# Patient Record
Sex: FEMALE | Race: WHITE | NOT HISPANIC OR LATINO | URBAN - METROPOLITAN AREA
[De-identification: names, ages, dates, MRNs, and addresses within clinical notes are randomized per-mention and may not be internally consistent; named-entity substitution may affect disease eponyms.]

---

## 2018-03-11 ENCOUNTER — EMERGENCY (EMERGENCY)
Facility: HOSPITAL | Age: 48
LOS: 1 days | Discharge: HOME | End: 2018-03-11
Attending: EMERGENCY MEDICINE
Payer: COMMERCIAL

## 2018-03-11 VITALS
OXYGEN SATURATION: 99 % | TEMPERATURE: 98 F | HEART RATE: 95 BPM | RESPIRATION RATE: 20 BRPM | DIASTOLIC BLOOD PRESSURE: 61 MMHG | SYSTOLIC BLOOD PRESSURE: 116 MMHG

## 2018-03-11 VITALS
TEMPERATURE: 98 F | HEART RATE: 76 BPM | OXYGEN SATURATION: 97 % | RESPIRATION RATE: 18 BRPM | DIASTOLIC BLOOD PRESSURE: 75 MMHG | SYSTOLIC BLOOD PRESSURE: 165 MMHG

## 2018-03-11 LAB
ALBUMIN SERPL ELPH-MCNC: 4.5 G/DL — SIGNIFICANT CHANGE UP (ref 3.3–5.2)
ALP SERPL-CCNC: 79 U/L — SIGNIFICANT CHANGE UP (ref 40–120)
ALT FLD-CCNC: 9 U/L — SIGNIFICANT CHANGE UP
ANION GAP SERPL CALC-SCNC: 15 MMOL/L — SIGNIFICANT CHANGE UP (ref 5–17)
AST SERPL-CCNC: 16 U/L — SIGNIFICANT CHANGE UP
BASOPHILS # BLD AUTO: 0 K/UL — SIGNIFICANT CHANGE UP (ref 0–0.2)
BASOPHILS NFR BLD AUTO: 0.4 % — SIGNIFICANT CHANGE UP (ref 0–2)
BILIRUB SERPL-MCNC: 0.3 MG/DL — LOW (ref 0.4–2)
BUN SERPL-MCNC: 9 MG/DL — SIGNIFICANT CHANGE UP (ref 8–20)
CALCIUM SERPL-MCNC: 9.6 MG/DL — SIGNIFICANT CHANGE UP (ref 8.6–10.2)
CHLORIDE SERPL-SCNC: 96 MMOL/L — LOW (ref 98–107)
CO2 SERPL-SCNC: 27 MMOL/L — SIGNIFICANT CHANGE UP (ref 22–29)
CREAT SERPL-MCNC: 0.47 MG/DL — LOW (ref 0.5–1.3)
EOSINOPHIL # BLD AUTO: 0 K/UL — SIGNIFICANT CHANGE UP (ref 0–0.5)
EOSINOPHIL NFR BLD AUTO: 0.2 % — SIGNIFICANT CHANGE UP (ref 0–6)
GLUCOSE SERPL-MCNC: 234 MG/DL — HIGH (ref 70–115)
HCG SERPL-ACNC: <5 MIU/ML — SIGNIFICANT CHANGE UP
HCT VFR BLD CALC: 36.9 % — LOW (ref 37–47)
HGB BLD-MCNC: 11.3 G/DL — LOW (ref 12–16)
LIDOCAIN IGE QN: 8 U/L — LOW (ref 22–51)
LYMPHOCYTES # BLD AUTO: 1 K/UL — SIGNIFICANT CHANGE UP (ref 1–4.8)
LYMPHOCYTES # BLD AUTO: 10.7 % — LOW (ref 20–55)
MCHC RBC-ENTMCNC: 24.4 PG — LOW (ref 27–31)
MCHC RBC-ENTMCNC: 30.6 G/DL — LOW (ref 32–36)
MCV RBC AUTO: 79.5 FL — LOW (ref 81–99)
MONOCYTES # BLD AUTO: 0.6 K/UL — SIGNIFICANT CHANGE UP (ref 0–0.8)
MONOCYTES NFR BLD AUTO: 6.2 % — SIGNIFICANT CHANGE UP (ref 3–10)
NEUTROPHILS # BLD AUTO: 7.6 K/UL — SIGNIFICANT CHANGE UP (ref 1.8–8)
NEUTROPHILS NFR BLD AUTO: 82.4 % — HIGH (ref 37–73)
PLATELET # BLD AUTO: 522 K/UL — HIGH (ref 150–400)
POTASSIUM SERPL-MCNC: 4 MMOL/L — SIGNIFICANT CHANGE UP (ref 3.5–5.3)
POTASSIUM SERPL-SCNC: 4 MMOL/L — SIGNIFICANT CHANGE UP (ref 3.5–5.3)
PROT SERPL-MCNC: 7.6 G/DL — SIGNIFICANT CHANGE UP (ref 6.6–8.7)
RBC # BLD: 4.64 M/UL — SIGNIFICANT CHANGE UP (ref 4.4–5.2)
RBC # FLD: 13.8 % — SIGNIFICANT CHANGE UP (ref 11–15.6)
SODIUM SERPL-SCNC: 138 MMOL/L — SIGNIFICANT CHANGE UP (ref 135–145)
WBC # BLD: 9.2 K/UL — SIGNIFICANT CHANGE UP (ref 4.8–10.8)
WBC # FLD AUTO: 9.2 K/UL — SIGNIFICANT CHANGE UP (ref 4.8–10.8)

## 2018-03-11 PROCEDURE — 85027 COMPLETE CBC AUTOMATED: CPT

## 2018-03-11 PROCEDURE — 84702 CHORIONIC GONADOTROPIN TEST: CPT

## 2018-03-11 PROCEDURE — 99284 EMERGENCY DEPT VISIT MOD MDM: CPT | Mod: 25

## 2018-03-11 PROCEDURE — 93005 ELECTROCARDIOGRAM TRACING: CPT

## 2018-03-11 PROCEDURE — 36415 COLL VENOUS BLD VENIPUNCTURE: CPT

## 2018-03-11 PROCEDURE — 93010 ELECTROCARDIOGRAM REPORT: CPT

## 2018-03-11 PROCEDURE — 82962 GLUCOSE BLOOD TEST: CPT

## 2018-03-11 PROCEDURE — 96375 TX/PRO/DX INJ NEW DRUG ADDON: CPT

## 2018-03-11 PROCEDURE — 83690 ASSAY OF LIPASE: CPT

## 2018-03-11 PROCEDURE — 80053 COMPREHEN METABOLIC PANEL: CPT

## 2018-03-11 PROCEDURE — 96374 THER/PROPH/DIAG INJ IV PUSH: CPT

## 2018-03-11 RX ORDER — SODIUM CHLORIDE 9 MG/ML
3 INJECTION INTRAMUSCULAR; INTRAVENOUS; SUBCUTANEOUS ONCE
Qty: 0 | Refills: 0 | Status: COMPLETED | OUTPATIENT
Start: 2018-03-11 | End: 2018-03-11

## 2018-03-11 RX ORDER — ACETAMINOPHEN 500 MG
650 TABLET ORAL ONCE
Qty: 0 | Refills: 0 | Status: COMPLETED | OUTPATIENT
Start: 2018-03-11 | End: 2018-03-11

## 2018-03-11 RX ORDER — ONDANSETRON 8 MG/1
4 TABLET, FILM COATED ORAL ONCE
Qty: 0 | Refills: 0 | Status: COMPLETED | OUTPATIENT
Start: 2018-03-11 | End: 2018-03-11

## 2018-03-11 RX ORDER — METOCLOPRAMIDE HCL 10 MG
10 TABLET ORAL ONCE
Qty: 0 | Refills: 0 | Status: COMPLETED | OUTPATIENT
Start: 2018-03-11 | End: 2018-03-11

## 2018-03-11 RX ORDER — SODIUM CHLORIDE 9 MG/ML
1000 INJECTION INTRAMUSCULAR; INTRAVENOUS; SUBCUTANEOUS ONCE
Qty: 0 | Refills: 0 | Status: COMPLETED | OUTPATIENT
Start: 2018-03-11 | End: 2018-03-11

## 2018-03-11 RX ORDER — METOCLOPRAMIDE HCL 10 MG
1 TABLET ORAL
Qty: 24 | Refills: 0 | OUTPATIENT
Start: 2018-03-11

## 2018-03-11 RX ORDER — LIDOCAINE 4 G/100G
10 CREAM TOPICAL ONCE
Qty: 0 | Refills: 0 | Status: COMPLETED | OUTPATIENT
Start: 2018-03-11 | End: 2018-03-11

## 2018-03-11 RX ORDER — FAMOTIDINE 10 MG/ML
20 INJECTION INTRAVENOUS ONCE
Qty: 0 | Refills: 0 | Status: COMPLETED | OUTPATIENT
Start: 2018-03-11 | End: 2018-03-11

## 2018-03-11 RX ORDER — PROCHLORPERAZINE MALEATE 5 MG
1 TABLET ORAL
Qty: 5 | Refills: 0 | OUTPATIENT
Start: 2018-03-11

## 2018-03-11 RX ADMIN — LIDOCAINE 10 MILLILITER(S): 4 CREAM TOPICAL at 05:56

## 2018-03-11 RX ADMIN — SODIUM CHLORIDE 1000 MILLILITER(S): 9 INJECTION INTRAMUSCULAR; INTRAVENOUS; SUBCUTANEOUS at 10:12

## 2018-03-11 RX ADMIN — SODIUM CHLORIDE 1000 MILLILITER(S): 9 INJECTION INTRAMUSCULAR; INTRAVENOUS; SUBCUTANEOUS at 08:03

## 2018-03-11 RX ADMIN — Medication 650 MILLIGRAM(S): at 08:02

## 2018-03-11 RX ADMIN — Medication 30 MILLILITER(S): at 05:56

## 2018-03-11 RX ADMIN — Medication 10 MILLIGRAM(S): at 05:56

## 2018-03-11 RX ADMIN — FAMOTIDINE 20 MILLIGRAM(S): 10 INJECTION INTRAVENOUS at 05:56

## 2018-03-11 RX ADMIN — SODIUM CHLORIDE 1000 MILLILITER(S): 9 INJECTION INTRAMUSCULAR; INTRAVENOUS; SUBCUTANEOUS at 05:56

## 2018-03-11 RX ADMIN — SODIUM CHLORIDE 3 MILLILITER(S): 9 INJECTION INTRAMUSCULAR; INTRAVENOUS; SUBCUTANEOUS at 06:08

## 2018-03-11 RX ADMIN — ONDANSETRON 4 MILLIGRAM(S): 8 TABLET, FILM COATED ORAL at 10:11

## 2018-03-11 RX ADMIN — Medication 650 MILLIGRAM(S): at 08:59

## 2018-03-11 NOTE — ED ADULT NURSE NOTE - OBJECTIVE STATEMENT
pt A&Ox4, c/o abd pain n/v [id number], resp even and unlabored no distress noted, abd soft NTND, pt denies chest pain, ha, dizziness, sob, diarrhea fever/chills

## 2018-03-11 NOTE — ED PROVIDER NOTE - ATTENDING CONTRIBUTION TO CARE
ATTENDING MD: I, Ashvin Weaver, have seen and evaluated this patient with the advance practice clinician.  I have discussed the history, exam ,and aspects of care with the APC.  I have reviewed the APC's note. I agree with the findings  unless other wise stated in the HPI, PE, MDM, and progress notes.       nontoxic female. uncomfortable, AOx4. NCAT, eyes clear, mucus membranes are dry, neck supple, oropharynx WNL, heart normal rate, regular rhythm, lungs clear to auscultation bilaterally, equal breath sounds bilaterally, abd soft, nontender, nondistended, hypoactive bowel sounds, no CVAT, skin warm/dry, nonfocal neuro exam    MDM: 47f w/hx of IDDM1 present with vomiting/PO intolerance without significant pain similar to piror gasroparesis. improved with reglan and hydration, plan to PO challenge. labs reassuring .if tolerates PO will DC with reglan/compazine suppositories. pt has GI and PCP and endocrine specialists, declines referral. this is plan as signed out to Artis

## 2018-03-11 NOTE — ED ADULT NURSE REASSESSMENT NOTE - NS ED NURSE REASSESS COMMENT FT1
Patient received sleeping in cart, patient awakes to verbal stimuli.  Patient Patient received sleeping in cart, patient awakes to verbal stimuli.  Patient FERNANDEZ.  Patient is awake and alert x4.  Patient has NS infusing into patient iv site.  Patient states feeling better from earlier.

## 2018-03-11 NOTE — ED PROVIDER NOTE - CARE PLAN
Principal Discharge DX:	Vomiting Principal Discharge DX:	Diabetic gastroparesis  Secondary Diagnosis:	Vomiting

## 2018-03-11 NOTE — ED ADULT NURSE REASSESSMENT NOTE - NS ED NURSE REASSESS COMMENT FT1
Patient resting in cart, to be discharged at this time.  Patient has no labored breathing, patient is in no acute distress.  Patient denies complaints.

## 2018-03-11 NOTE — ED PROVIDER NOTE - OBJECTIVE STATEMENT
48 y/o female with PMHx IDDM presents to the ED with c/o vomiting. Pt reports sudden onset of weakness and n/v that began around 1930 yesterday. Pt states she was out all day yesterday. Pt denies alcohol use, trauma, confusion, fever, chills, and any other acute symptoms and complaints at this time. Pt poor historian. 48 y/o female with PMHx IDDM1 presents to the ED with c/o vomiting. Pt reports sudden onset of weakness and n/v that began around 1930 yesterday. Pt states she was out all day yesterday. Pt denies alcohol use, trauma, confusion, fever, chills, and any other acute symptoms and complaints at this time. Pt poor historian.    ATTENDING MD Meg: agree with above, also noting that pt hs had prior peisodes of gastroparesis (not volunteered but answered when asked) and says this feels very similar

## 2018-07-25 PROBLEM — E11.9 TYPE 2 DIABETES MELLITUS WITHOUT COMPLICATIONS: Chronic | Status: ACTIVE | Noted: 2018-03-11

## 2018-08-06 PROBLEM — Z00.00 ENCOUNTER FOR PREVENTIVE HEALTH EXAMINATION: Status: ACTIVE | Noted: 2018-08-06

## 2018-09-13 ENCOUNTER — MOBILE ON CALL (OUTPATIENT)
Age: 48
End: 2018-09-13

## 2018-09-26 ENCOUNTER — APPOINTMENT (OUTPATIENT)
Dept: ENDOCRINOLOGY | Facility: CLINIC | Age: 48
End: 2018-09-26

## 2018-10-24 ENCOUNTER — RX RENEWAL (OUTPATIENT)
Age: 48
End: 2018-10-24

## 2018-12-14 ENCOUNTER — RECORD ABSTRACTING (OUTPATIENT)
Age: 48
End: 2018-12-14

## 2018-12-14 DIAGNOSIS — Z86.59 PERSONAL HISTORY OF OTHER MENTAL AND BEHAVIORAL DISORDERS: ICD-10-CM

## 2018-12-14 DIAGNOSIS — Z86.39 PERSONAL HISTORY OF OTHER ENDOCRINE, NUTRITIONAL AND METABOLIC DISEASE: ICD-10-CM

## 2018-12-20 ENCOUNTER — APPOINTMENT (OUTPATIENT)
Dept: ENDOCRINOLOGY | Facility: CLINIC | Age: 48
End: 2018-12-20

## 2019-02-28 ENCOUNTER — RESULT CHARGE (OUTPATIENT)
Age: 49
End: 2019-02-28

## 2019-02-28 ENCOUNTER — APPOINTMENT (OUTPATIENT)
Dept: ENDOCRINOLOGY | Facility: CLINIC | Age: 49
End: 2019-02-28
Payer: COMMERCIAL

## 2019-02-28 VITALS
HEIGHT: 61 IN | BODY MASS INDEX: 25.3 KG/M2 | DIASTOLIC BLOOD PRESSURE: 70 MMHG | WEIGHT: 134 LBS | HEART RATE: 90 BPM | SYSTOLIC BLOOD PRESSURE: 130 MMHG

## 2019-02-28 DIAGNOSIS — Z86.59 PERSONAL HISTORY OF OTHER MENTAL AND BEHAVIORAL DISORDERS: ICD-10-CM

## 2019-02-28 DIAGNOSIS — Z83.49 FAMILY HISTORY OF OTHER ENDOCRINE, NUTRITIONAL AND METABOLIC DISEASES: ICD-10-CM

## 2019-02-28 LAB — GLUCOSE BLDC GLUCOMTR-MCNC: 132

## 2019-02-28 PROCEDURE — 82962 GLUCOSE BLOOD TEST: CPT

## 2019-02-28 PROCEDURE — 99214 OFFICE O/P EST MOD 30 MIN: CPT | Mod: 25

## 2019-02-28 RX ORDER — LAMOTRIGINE 100 MG/1
100 TABLET ORAL
Refills: 0 | Status: DISCONTINUED | COMMUNITY
End: 2019-02-28

## 2019-02-28 NOTE — REVIEW OF SYSTEMS
[Fatigue] : fatigue [Recent Weight Loss (___ Lbs)] : recent [unfilled] ~Ulb weight loss [Palpitations] : palpitations [Nausea] : nausea [Pain/Numbness of Digits] : pain/numbness of digits [Chest Pain] : no chest pain [Shortness Of Breath] : no shortness of breath [Vomiting] : no vomiting was observed [Abdominal Pain] : no abdominal pain [Polyuria] : no polyuria [Polydipsia] : no polydipsia

## 2019-02-28 NOTE — PHYSICAL EXAM
[Alert] : alert [No Acute Distress] : no acute distress [Well Nourished] : well nourished [Well Developed] : well developed [Normal Sclera/Conjunctiva] : normal sclera/conjunctiva [EOMI] : extra ocular movement intact [No Proptosis] : no proptosis [Thyroid Not Enlarged] : the thyroid was not enlarged [No Thyroid Nodules] : there were no palpable thyroid nodules [No Respiratory Distress] : no respiratory distress [No Accessory Muscle Use] : no accessory muscle use [Clear to Auscultation] : lungs were clear to auscultation bilaterally [Normal Rate] : heart rate was normal  [Normal S1, S2] : normal S1 and S2 [Regular Rhythm] : with a regular rhythm [No Edema] : there was no peripheral edema [Anterior Cervical Nodes] : anterior cervical nodes [Normal] : normal and non tender [Spine Straight] : spine straight [No Stigmata of Cushings Syndrome] : no stigmata of cushings syndrome [Oriented x3] : oriented to person, place, and time [Acanthosis Nigricans] : no acanthosis nigricans

## 2019-02-28 NOTE — HISTORY OF PRESENT ILLNESS
[FreeTextEntry1] : Patient presents today for routine follow-up of T1DM, hypertension, and vitamin D deficiency. She reports fatigue and general malaise for the past 6 months. Diagnosed with iron deficiency anemia and started on supplemental iron by PCP. Feels her depression makes it more difficult for her to manage her diabetes. She is seeing someone for her depression.\par \par Type: 1\par Severity: poor control\par Duration: since 9 years old\par Associated symptoms: retinopathy, neuropathy, hypoglycemia\par Modifying factors: better with insulin, CSII\par \par Current meds for glycemic control:\par Omnipod with Novolog\par Was not approved for Dexcom, but  has it and they have the same insurance so she is unsure why.\par Reports frequent SMBG. Reports hypoglycemia in AM, but has improved over the past month. Reviewed meter. One hypoglycemic episode in AM to 48. Otherwise, BG has been variable and high, mid-100s to 200s with some readings in the 300s.\par 7 day avg (14 tests) = 211\par 14 day avg (19 tests) = 216\par 30 day avg (103 tests) = 211\par Reviewed pump download. Avg , low of 429 and high of 151. She reports that she checks BG with glucometer (OneTouch) more often than she enters readings into the pump. Linked meter is not working. She rarely enters carbohydrate intake into pump and usually just gives herself a manual bolus, usually 3-4x daily but some days not at all.\par Current B, took bolus for  less than 30 minutes ago.\par \par Last eye exam: 2018, history of DR. No changes in vision\par Last foot exam: , occasional pain/numbness/tingling in feet\par Diet: GI symptoms improved, but still with decreased appetite\par Weight: 40 pounds in 2 years, unintentional\par Exercise: treadmill occasionally

## 2019-02-28 NOTE — ASSESSMENT
[FreeTextEntry1] : 48 year old female with poorly controlled T1DM with associated retinopathy and neuropathy, also with hypertension and vitamin D deficiency. Glycemic control is above goal based on recent BG data.\par \par 1. T1DM\par -Needs to use carb ratios and enter BG into pump for bolus more consistently.\par -Repeat A1c now.\par -Discussed use of CMG, Dexcom vs. Freestyle Moses. Patient would be a better candidate for the Dexcom given its alarm capabilities and the variability in her BG with history of hypoglycemia.\par -Complete basal checks and follow-up in office in one week for adjustments to pump settings.\par -Check iron levels given fatigue.\par \par 2. Hypertension- controlled.\par \par 3. Vitamin D deficiency\par -Continue supplement\par -Check level now.

## 2019-03-05 ENCOUNTER — APPOINTMENT (OUTPATIENT)
Dept: ENDOCRINOLOGY | Facility: CLINIC | Age: 49
End: 2019-03-05

## 2019-03-15 ENCOUNTER — CLINICAL ADVICE (OUTPATIENT)
Age: 49
End: 2019-03-15

## 2019-04-04 ENCOUNTER — APPOINTMENT (OUTPATIENT)
Dept: ENDOCRINOLOGY | Facility: CLINIC | Age: 49
End: 2019-04-04

## 2019-05-22 ENCOUNTER — APPOINTMENT (OUTPATIENT)
Dept: ENDOCRINOLOGY | Facility: CLINIC | Age: 49
End: 2019-05-22
Payer: COMMERCIAL

## 2019-05-22 ENCOUNTER — RESULT CHARGE (OUTPATIENT)
Age: 49
End: 2019-05-22

## 2019-05-22 VITALS
HEART RATE: 99 BPM | DIASTOLIC BLOOD PRESSURE: 62 MMHG | HEIGHT: 60 IN | WEIGHT: 136 LBS | SYSTOLIC BLOOD PRESSURE: 114 MMHG | BODY MASS INDEX: 26.7 KG/M2

## 2019-05-22 LAB — GLUCOSE BLDC GLUCOMTR-MCNC: 249

## 2019-05-22 PROCEDURE — 82962 GLUCOSE BLOOD TEST: CPT

## 2019-05-22 PROCEDURE — 99215 OFFICE O/P EST HI 40 MIN: CPT | Mod: 25

## 2019-05-22 NOTE — PHYSICAL EXAM
[Alert] : alert [No Acute Distress] : no acute distress [Well Nourished] : well nourished [Well Developed] : well developed [Normal Sclera/Conjunctiva] : normal sclera/conjunctiva [EOMI] : extra ocular movement intact [Thyroid Not Enlarged] : the thyroid was not enlarged [No Thyroid Nodules] : there were no palpable thyroid nodules [No Respiratory Distress] : no respiratory distress [No Accessory Muscle Use] : no accessory muscle use [Clear to Auscultation] : lungs were clear to auscultation bilaterally [Normal Rate] : heart rate was normal  [Normal S1, S2] : normal S1 and S2 [Regular Rhythm] : with a regular rhythm [No Edema] : there was no peripheral edema [Anterior Cervical Nodes] : anterior cervical nodes [Normal] : normal and non tender [Spine Straight] : spine straight [No Stigmata of Cushings Syndrome] : no stigmata of cushings syndrome [Acanthosis Nigricans] : no acanthosis nigricans [Oriented x3] : oriented to person, place, and time [de-identified] : moles - darker in color on left foot medial surface

## 2019-05-22 NOTE — HISTORY OF PRESENT ILLNESS
[FreeTextEntry1] : Patient presents today for routine follow-up of T1DM, hypertension, and vitamin D deficiency. \par pt has not martha doing well with BS \par  some variability \par was in hospital about 4 week  ago- had nausea and vomiting  went to ER next day however bc could nto stop vomiting \par  BS were high at first - 400's then came down to the 200's \par \par Type: 1\par Severity: poor control\par Duration: since 9 years old\par Associated symptoms: retinopathy, neuropathy, hypoglycemia\par Modifying factors: better with insulin, CSII\par \par Current meds for glycemic control:\par Omnipod with Novolog\par would like to try for Dexcom again \par Reports frequent SMBG. cannot use PDM for BS as it does not work \par using Contiour next  and One touch Ultra \par Reports hypoglycemia in AM, but has improved over the past month. Reviewed meter. O some variable low BS to 40-50's \par  some readign 200-330 \par  some time 120-180\par 7 day avg (14 tests) = 211\par 14 day avg (19 tests) = 216\par 30 day avg (103 tests) = 211\par Reviewed pump download. Avg , low of 429 and high of 151. She reports that she checks BG with glucometer (OneTouch) more often than she enters readings into the pump. Linked meter is not working. She rarely enters carbohydrate intake into pump and usually just gives herself a manual bolus, usually 3-4x daily but some days not at all.\par Current Bs ago.\par \par Last eye exam: 2018, history of DR. No changes in vision\par Last foot exam: 2018, occasional pain/numbness/tingling in feet\par Diet: GI symptoms improved, but still with decreased appetite skips breakfast \par [place]- protein bar \par dinner usual\par Weight: 40 pounds in 2 years, unintentional\par Exercise: treadmill occasionally [Hypoglycemia] : hypoglycemic

## 2019-05-22 NOTE — REVIEW OF SYSTEMS
[Fatigue] : fatigue [Recent Weight Loss (___ Lbs)] : recent [unfilled] ~Ulb weight loss [Chest Pain] : no chest pain [Palpitations] : palpitations [Shortness Of Breath] : no shortness of breath [Nausea] : nausea [Vomiting] : no vomiting was observed [Abdominal Pain] : no abdominal pain [Polyuria] : no polyuria [Pain/Numbness of Digits] : pain/numbness of digits [Depression] : depression [Anxiety] : anxiety [Polydipsia] : no polydipsia

## 2019-05-22 NOTE — ASSESSMENT
[FreeTextEntry1] : 48 year old female with poorly controlled T1DM with associated retinopathy and neuropathy, also with hypertension and vitamin D deficiency. Glycemic control is above goal based on recent BG data.\par \par 1. [id number] carb  ratio to 1:9  to avoid hypoglcyemia  after protein bar in AM \par Needs to use carb ratios and enter BG into pump for bolus more consistently.\par -Repeat A1c now.\par -Discussed use of CGM Dexcom  Patient would be a better candidate for the Dexcom given its alarm capabilities and the variability in her BG with history of hypoglycemia. will try to reapply - get records from [facility] \par \par GI -see GI for r/o gastroparesis  T1DM x 40 years\par \par 2. Hypertension- controlled.\par \par 3. Vitamin D deficiency\par -Continue supplement\par -Check level now.\par \par 4.  Depression -  No SI  Will refer  to Dr Hammond \par \par 5. will  call insurance company -to  give me FMLA forms to fill out \par \par 6> Derm - see for moles

## 2019-10-22 ENCOUNTER — RESULT CHARGE (OUTPATIENT)
Age: 49
End: 2019-10-22

## 2019-10-23 ENCOUNTER — APPOINTMENT (OUTPATIENT)
Dept: ENDOCRINOLOGY | Facility: CLINIC | Age: 49
End: 2019-10-23
Payer: COMMERCIAL

## 2019-10-23 VITALS — HEART RATE: 105 BPM | SYSTOLIC BLOOD PRESSURE: 120 MMHG | DIASTOLIC BLOOD PRESSURE: 64 MMHG | HEIGHT: 61 IN

## 2019-10-23 DIAGNOSIS — E11.65 TYPE 2 DIABETES MELLITUS WITH HYPERGLYCEMIA: ICD-10-CM

## 2019-10-23 LAB
GLUCOSE BLDC GLUCOMTR-MCNC: 105
GLUCOSE BLDC GLUCOMTR-MCNC: 89

## 2019-10-23 PROCEDURE — 82962 GLUCOSE BLOOD TEST: CPT

## 2019-10-23 PROCEDURE — 99214 OFFICE O/P EST MOD 30 MIN: CPT | Mod: 25

## 2019-10-23 RX ORDER — CHROMIUM 200 MCG
1000 TABLET ORAL DAILY
Refills: 0 | Status: ACTIVE | COMMUNITY

## 2019-10-23 RX ORDER — FLUOXETINE HYDROCHLORIDE 20 MG/1
20 CAPSULE ORAL DAILY
Refills: 0 | Status: ACTIVE | COMMUNITY

## 2019-10-23 RX ORDER — MV-MIN/FOLIC/VIT K/LYCOP/COQ10 200-100MCG
CAPSULE ORAL DAILY
Refills: 0 | Status: ACTIVE | COMMUNITY

## 2019-10-23 RX ORDER — BLOOD SUGAR DIAGNOSTIC
STRIP MISCELLANEOUS
Qty: 800 | Refills: 1 | Status: DISCONTINUED | COMMUNITY
End: 2019-10-23

## 2019-10-30 PROBLEM — E11.65 DIABETES MELLITUS WITH HYPERGLYCEMIA: Status: RESOLVED | Noted: 2018-10-23 | Resolved: 2019-10-30

## 2019-10-30 NOTE — PHYSICAL EXAM
[Alert] : alert [No Acute Distress] : no acute distress [Well Nourished] : well nourished [Well Developed] : well developed [Normal Sclera/Conjunctiva] : normal sclera/conjunctiva [EOMI] : extra ocular movement intact [Thyroid Not Enlarged] : the thyroid was not enlarged [No Thyroid Nodules] : there were no palpable thyroid nodules [No Respiratory Distress] : no respiratory distress [No Accessory Muscle Use] : no accessory muscle use [Clear to Auscultation] : lungs were clear to auscultation bilaterally [Normal Rate] : heart rate was normal  [Normal S1, S2] : normal S1 and S2 [Regular Rhythm] : with a regular rhythm [No Edema] : there was no peripheral edema [Anterior Cervical Nodes] : anterior cervical nodes [Normal] : normal and non tender [Spine Straight] : spine straight [No Stigmata of Cushings Syndrome] : no stigmata of cushings syndrome [Acanthosis Nigricans] : no acanthosis nigricans [Oriented x3] : oriented to person, place, and time [de-identified] : moles - darker in color on left foot medial surface

## 2019-10-30 NOTE — ASSESSMENT
[FreeTextEntry1] : 48 year old female with poorly controlled T1DM with associated retinopathy and neuropathy, also with hypertension and vitamin D deficiency. Glycemic control is above goal based on recent BG data.  Pt with several weeks of Am hypoglycemia \par \par 1. [id number] AM  basal rate as follows \par  12Am- 3 am  0.7 to 0.6\par keep 3am to 6 am 0.6\par  6am to 12 am 0.9 to 0.8 \par change sensitivity as when corrects pt bottoms out - change 12 Am -- 75  to 100 \par -Repeat A1c now.\par -Discussed use of CGM Dexcom  Patient would be a better candidate for the Dexcom given its alarm capabilities and the variability in her BG with history of hypoglycemia. \par \par GI -see GI for r/o gastroparesis  T1DM x 40 years\par \par 2. Hypertension- controlled.\par \par 3. Vitamin D deficiency\par -Continue supplement\par -Check level now.\par \par 4.  Depression - managing it better \par \par 5. pt will  call insurance company -to  give me FMLA forms to fill out \par \par 6> Derm - see for moles

## 2019-10-30 NOTE — HISTORY OF PRESENT ILLNESS
[FreeTextEntry1] : Patient presents today for routine follow-up of T1DM, hypertension, and vitamin D deficiency. \par pt has not martha doing well with BS \par  some variability \par \par Type: 1\par Severity: poor control\par Duration: since 9 years old\par Associated symptoms: retinopathy, neuropathy, hypoglycemia\par Modifying factors: better with insulin, CSII\par \par Current meds for glycemic control:\par Omnipod with Novolog\par would like to try for Dexcom again \par Reports frequent SMBG. cannot use PDM for BS as it does not work \par using Contiour next  and One touch Ultra \par Reports hypoglycemia in AM.pump download reviewed   BS are variable  no meter with her \par  some time 120-180\par Reviewed pump download. Avg , low of 429 and high of 151. She reports that she checks BG with glucometer (OneTouch) more often than she enters readings into the pump.\par Current Bwas 179 earlier  after having low BS at 3AM \par some Am lows past few  weeks\par has been tryinginermittnet fasting - not as successful \par \par \par AM 56-60 \par \par \par did not see podaitry  for moles on her feet \par \par Last eye exam: 2018, history of DR. No changes in vision\par Last foot exam: , occasional pain/numbness/tingling in feet\par Diet: GI symptoms improved, but still with decreased appetite skips breakfast \par [place]- protein bar \par dinner usual\par Weight: 40 pounds in 2 years, unintentional\par Exercise: treadmill occasionally [Hypoglycemia] : hypoglycemic

## 2020-02-12 ENCOUNTER — APPOINTMENT (OUTPATIENT)
Dept: ENDOCRINOLOGY | Facility: CLINIC | Age: 50
End: 2020-02-12

## 2020-09-04 ENCOUNTER — APPOINTMENT (OUTPATIENT)
Dept: ENDOCRINOLOGY | Facility: CLINIC | Age: 50
End: 2020-09-04

## 2020-10-02 ENCOUNTER — APPOINTMENT (OUTPATIENT)
Dept: ENDOCRINOLOGY | Facility: CLINIC | Age: 50
End: 2020-10-02
Payer: COMMERCIAL

## 2020-10-02 VITALS
WEIGHT: 137 LBS | SYSTOLIC BLOOD PRESSURE: 126 MMHG | DIASTOLIC BLOOD PRESSURE: 72 MMHG | BODY MASS INDEX: 25.86 KG/M2 | HEIGHT: 61 IN | HEART RATE: 96 BPM | OXYGEN SATURATION: 99 %

## 2020-10-02 LAB — GLUCOSE BLDC GLUCOMTR-MCNC: 169

## 2020-10-02 PROCEDURE — 99214 OFFICE O/P EST MOD 30 MIN: CPT | Mod: 25

## 2020-10-02 PROCEDURE — 82962 GLUCOSE BLOOD TEST: CPT

## 2020-10-02 RX ORDER — INSULIN DEGLUDEC INJECTION 100 U/ML
100 INJECTION, SOLUTION SUBCUTANEOUS
Refills: 0 | Status: DISCONTINUED | COMMUNITY
End: 2020-10-02

## 2020-10-02 NOTE — ASSESSMENT
[FreeTextEntry1] : 50 year old female with poorly controlled Type 1 DM with associated retinopathy and neuropathy, also with hypertension and vitamin D deficiency. \par \par 1. Type 1 DM - unable to make pump changes due to limited data\par - schedule appointment with CDE to start Dexcom and diet education \par -Repeat A1c now.\par \par GI -see GI for r/o gastroparesis T1DM x 40 years\par \par 2. Hypertension- controlled.\par \par 3. Vitamin D deficiency\par -Continue supplement\par -Check level now.\par \par 4. Depression - managing it better \par \par 5. Derm - see for moles. \par \par RTO in 5 weeks

## 2020-10-02 NOTE — PHYSICAL EXAM
[Alert] : alert [Well Nourished] : well nourished [No Acute Distress] : no acute distress [Well Developed] : well developed [Normal Sclera/Conjunctiva] : normal sclera/conjunctiva [EOMI] : extra ocular movement intact [No LAD] : no lymphadenopathy [Thyroid Not Enlarged] : the thyroid was not enlarged [No Thyroid Nodules] : no palpable thyroid nodules [No Respiratory Distress] : no respiratory distress [Normal Rate and Effort] : normal respiratory rate and effort [Clear to Auscultation] : lungs were clear to auscultation bilaterally [Normal S1, S2] : normal S1 and S2 [Normal Rate] : heart rate was normal [Regular Rhythm] : with a regular rhythm [No Edema] : no peripheral edema [Pedal Pulses Normal] : the pedal pulses are present [Normal Bowel Sounds] : normal bowel sounds [Not Tender] : non-tender [Soft] : abdomen soft [Normal Gait] : normal gait [No Rash] : no rash [Right Foot Was Examined] : right foot ~C was examined [Left Foot Was Examined] : left foot ~C was examined [Normal] : normal [No Motor Deficits] : the motor exam was normal [No Tremors] : no tremors [Oriented x3] : oriented to person, place, and time [Normal Insight/Judgement] : insight and judgment were intact [Normal Mood] : the mood was normal [Acanthosis Nigricans] : no acanthosis nigricans [Foot Ulcers] : no foot ulcers [Diminished Throughout Both Feet] : normal tactile sensation with monofilament testing throughout both feet

## 2020-10-02 NOTE — REVIEW OF SYSTEMS
[Hair Loss] : hair loss [Fatigue] : no fatigue [Decreased Appetite] : appetite not decreased [Blurred Vision] : no blurred vision [Dysphagia] : no dysphagia [Neck Pain] : no neck pain [Chest Pain] : no chest pain [Palpitations] : no palpitations [Shortness Of Breath] : no shortness of breath [Cough] : no cough [Constipation] : no constipation [Diarrhea] : no diarrhea [Polyuria] : no polyuria [Joint Pain] : no joint pain [Muscle Weakness] : no muscle weakness [Dry Skin] : no dry skin [Headaches] : no headaches [Tremors] : no tremors [Depression] : no depression [Anxiety] : no anxiety [Cold Intolerance] : no cold intolerance [Heat Intolerance] : no heat intolerance [Swelling] : no swelling

## 2020-10-02 NOTE — HISTORY OF PRESENT ILLNESS
[FreeTextEntry1] : Type 1 DM\par Severity: poor control\par Duration: since 9 years old\par Associated symptoms: retinopathy, neuropathy, hypoglycemia\par Modifying factors: better with insulin, CSII\par \par Current meds for glycemic control:\par Omnipod with Novolog\par would like to try for Dexcom again \par Reports frequent SMBG. cannot use PDM for BS as it does not work \par using Contour next and One touch Ultra \par Denies hypoglycemia pump download reviewed BS are variable no meter with her \par \par Reviewed pump download. Avg . She reports that she checks BG with glucometer (OneTouch) more often than she enters readings into the pump. Only places high blood sugars in pump. One day pt. did not bolus at all \par Current B - fasting \par some Am lows past few weeks\par has been trying intermittent fasting - not as successful \par \par did not see podiatry for moles on her feet \par \par Last eye exam: 2019, history of DR. No changes in vision\par Last foot exam: 2019, occasional pain/numbness/tingling in feet\par Diet: GI symptoms improved, but still with decreased appetite skips breakfast \par [place]- protein bar \par dinner usual\par Weight: stable\par Exercise: treadmill occasionally

## 2020-10-05 LAB
25(OH)D3 SERPL-MCNC: 66 NG/ML
ALBUMIN SERPL ELPH-MCNC: 4.4 G/DL
ALP BLD-CCNC: 74 U/L
ALT SERPL-CCNC: 14 U/L
ANION GAP SERPL CALC-SCNC: 12 MMOL/L
AST SERPL-CCNC: 15 U/L
BASOPHILS # BLD AUTO: 0.07 K/UL
BASOPHILS NFR BLD AUTO: 1.6 %
BILIRUB SERPL-MCNC: 0.4 MG/DL
BUN SERPL-MCNC: 9 MG/DL
CALCIUM SERPL-MCNC: 9.8 MG/DL
CHLORIDE SERPL-SCNC: 98 MMOL/L
CHOLEST SERPL-MCNC: 201 MG/DL
CHOLEST/HDLC SERPL: 2.3 RATIO
CO2 SERPL-SCNC: 28 MMOL/L
CREAT SERPL-MCNC: 0.65 MG/DL
EOSINOPHIL # BLD AUTO: 0.1 K/UL
EOSINOPHIL NFR BLD AUTO: 2.3 %
ESTIMATED AVERAGE GLUCOSE: 186 MG/DL
FERRITIN SERPL-MCNC: 53 NG/ML
FERRITIN SERPL-MCNC: 56 NG/ML
GLUCOSE SERPL-MCNC: 179 MG/DL
HBA1C MFR BLD HPLC: 8.1 %
HCT VFR BLD CALC: 43.7 %
HDLC SERPL-MCNC: 86 MG/DL
HGB BLD-MCNC: 13.8 G/DL
IMM GRANULOCYTES NFR BLD AUTO: 0.2 %
IRON SATN MFR SERPL: 24 %
IRON SERPL-MCNC: 69 UG/DL
LDLC SERPL CALC-MCNC: 92 MG/DL
LYMPHOCYTES # BLD AUTO: 1.28 K/UL
LYMPHOCYTES NFR BLD AUTO: 29.4 %
MAN DIFF?: NORMAL
MCHC RBC-ENTMCNC: 29.9 PG
MCHC RBC-ENTMCNC: 31.6 GM/DL
MCV RBC AUTO: 94.8 FL
MONOCYTES # BLD AUTO: 0.41 K/UL
MONOCYTES NFR BLD AUTO: 9.4 %
NEUTROPHILS # BLD AUTO: 2.48 K/UL
NEUTROPHILS NFR BLD AUTO: 57.1 %
PLATELET # BLD AUTO: 449 K/UL
POTASSIUM SERPL-SCNC: 5.8 MMOL/L
PROT SERPL-MCNC: 7 G/DL
RBC # BLD: 4.61 M/UL
RBC # FLD: 12.2 %
SODIUM SERPL-SCNC: 138 MMOL/L
T4 FREE SERPL-MCNC: 1.2 NG/DL
TIBC SERPL-MCNC: 285 UG/DL
TRIGL SERPL-MCNC: 110 MG/DL
TSH SERPL-ACNC: 1.24 UIU/ML
UIBC SERPL-MCNC: 216 UG/DL
VIT B12 SERPL-MCNC: 1117 PG/ML
WBC # FLD AUTO: 4.35 K/UL

## 2020-10-19 ENCOUNTER — APPOINTMENT (OUTPATIENT)
Dept: ENDOCRINOLOGY | Facility: CLINIC | Age: 50
End: 2020-10-19

## 2020-10-27 ENCOUNTER — APPOINTMENT (OUTPATIENT)
Dept: ENDOCRINOLOGY | Facility: CLINIC | Age: 50
End: 2020-10-27

## 2020-10-27 RX ORDER — BLOOD SUGAR DIAGNOSTIC
STRIP MISCELLANEOUS
Qty: 3 | Refills: 3 | Status: DISCONTINUED | COMMUNITY
Start: 2019-10-23 | End: 2020-10-27

## 2020-12-15 ENCOUNTER — APPOINTMENT (OUTPATIENT)
Dept: ENDOCRINOLOGY | Facility: CLINIC | Age: 50
End: 2020-12-15

## 2020-12-21 ENCOUNTER — APPOINTMENT (OUTPATIENT)
Dept: ENDOCRINOLOGY | Facility: CLINIC | Age: 50
End: 2020-12-21

## 2021-01-07 ENCOUNTER — RX RENEWAL (OUTPATIENT)
Age: 51
End: 2021-01-07

## 2021-02-04 ENCOUNTER — RX RENEWAL (OUTPATIENT)
Age: 51
End: 2021-02-04

## 2021-04-23 ENCOUNTER — NON-APPOINTMENT (OUTPATIENT)
Age: 51
End: 2021-04-23

## 2021-05-04 ENCOUNTER — APPOINTMENT (OUTPATIENT)
Dept: ENDOCRINOLOGY | Facility: CLINIC | Age: 51
End: 2021-05-04
Payer: COMMERCIAL

## 2021-05-04 VITALS
DIASTOLIC BLOOD PRESSURE: 72 MMHG | BODY MASS INDEX: 27.02 KG/M2 | HEART RATE: 98 BPM | WEIGHT: 143 LBS | SYSTOLIC BLOOD PRESSURE: 126 MMHG

## 2021-05-04 VITALS — HEIGHT: 61 IN

## 2021-05-04 LAB
GLUCOSE BLDC GLUCOMTR-MCNC: 130
HBA1C MFR BLD HPLC: 8.1

## 2021-05-04 PROCEDURE — 99214 OFFICE O/P EST MOD 30 MIN: CPT | Mod: 25

## 2021-05-04 PROCEDURE — 99072 ADDL SUPL MATRL&STAF TM PHE: CPT

## 2021-05-04 PROCEDURE — 82962 GLUCOSE BLOOD TEST: CPT

## 2021-05-04 NOTE — HISTORY OF PRESENT ILLNESS
[FreeTextEntry1] : Hospitalized at [facility] in 2021 and 2021 for episodes of abdominal pain and vomiting. Mild DKA and possible sepsis.\par \par Type 1 DM\par Severity: poor control\par Duration: since 9 years old\par Associated symptoms: retinopathy, neuropathy, hypoglycemia\par Modifying factors: better with insulin, CSII\par \par Current meds for glycemic control:\par Omnipod with Novolog\par Would like to try for Dexcom again and wants to upgrade Omnipod.\par Reports frequent SMBG. Cannot use PDM for BS as it does not work \par Using Contour next and One touch Ultra \par Denies hypoglycemia.\par \par Reviewed pump download. Enters BG into pump infrequently, 1-2x daily. Never enters carb intake. Estimates appropriate bolus and delivers manual bolus.\par Reviewed meter. BG is highly variable, 70s to 400s.\par Current B, ate eggs, zhong, and coffee with toast for breakfast 3 hours ago \par \par Last eye exam: 2021, history of DR s/p laser treatment. \par Last foot exam: 2019, occasional pain/numbness/tingling in feet\par \par Diet: appetite varies, still with nausea and vomiting at least once per year\par Weight: +6 pounds\par Exercise: treadmill occasionally \par

## 2021-05-04 NOTE — ASSESSMENT
[FreeTextEntry1] : 50 year old female with poorly controlled Type 1 DM with associated retinopathy and neuropathy, also with hypertension and vitamin D deficiency. \par \par 1. Type 1 DM - unable to make pump changes due to limited data\par -Recommend Dexcom G6.\par -Upgrade to Omnipod DASH.\par -Complete basal checks, reviewed instructions.\par -Enter all BG data and carb in take into the pump.\par -Follow-up with GI to r/o gastroparesis given long standing history of diabetes.\par -Repeat A1c and RTO for follow-up in 6 weeks.\par \par 2. Hypertension- controlled.\par \par 3. Vitamin D deficiency\par -Continue supplement\par -Repeat level in 6 weeks.\par \par

## 2021-05-04 NOTE — REVIEW OF SYSTEMS
[Recent Weight Gain (___ Lbs)] : recent weight gain: [unfilled] lbs [Nausea] : nausea [Vomiting] : vomiting [Pain/Numbness of Digits] : pain/numbness of digits [Blurred Vision] : no blurred vision [Chest Pain] : no chest pain [Palpitations] : no palpitations [Shortness Of Breath] : no shortness of breath [Polyuria] : no polyuria [Polydipsia] : no polydipsia

## 2021-05-04 NOTE — PHYSICAL EXAM
[Alert] : alert [Well Nourished] : well nourished [No Acute Distress] : no acute distress [Well Developed] : well developed [Normal Sclera/Conjunctiva] : normal sclera/conjunctiva [EOMI] : extra ocular movement intact [No Proptosis] : no proptosis [Normal Oropharynx] : the oropharynx was normal [Thyroid Not Enlarged] : the thyroid was not enlarged [No Thyroid Nodules] : no palpable thyroid nodules [No Respiratory Distress] : no respiratory distress [No Accessory Muscle Use] : no accessory muscle use [Clear to Auscultation] : lungs were clear to auscultation bilaterally [Normal S1, S2] : normal S1 and S2 [Normal Rate] : heart rate was normal [Regular Rhythm] : with a regular rhythm [No Edema] : no peripheral edema [Normal Anterior Cervical Nodes] : no anterior cervical lymphadenopathy [Normal Posterior Cervical Nodes] : no posterior cervical lymphadenopathy [Normal Gait] : normal gait [Normal Strength/Tone] : muscle strength and tone were normal [No Rash] : no rash [Oriented x3] : oriented to person, place, and time [Normal Affect] : the affect was normal [Normal Mood] : the mood was normal [Acanthosis Nigricans] : no acanthosis nigricans

## 2021-05-21 ENCOUNTER — RX RENEWAL (OUTPATIENT)
Age: 51
End: 2021-05-21

## 2021-05-21 ENCOUNTER — APPOINTMENT (OUTPATIENT)
Dept: ENDOCRINOLOGY | Facility: CLINIC | Age: 51
End: 2021-05-21

## 2021-06-02 ENCOUNTER — APPOINTMENT (OUTPATIENT)
Dept: ENDOCRINOLOGY | Facility: CLINIC | Age: 51
End: 2021-06-02

## 2021-06-09 NOTE — ED ADULT NURSE NOTE - CAS EDN DISCHARGE ASSESSMENT
Per Dr. Amrik Jeffrey 20 mg daily prn for erectile dysfunction. PA initiated. Routed to PA team to advise. Thank you! Alert and oriented to person, place and time

## 2021-06-15 ENCOUNTER — APPOINTMENT (OUTPATIENT)
Dept: ENDOCRINOLOGY | Facility: CLINIC | Age: 51
End: 2021-06-15
Payer: COMMERCIAL

## 2021-06-15 VITALS
OXYGEN SATURATION: 99 % | HEIGHT: 61 IN | HEART RATE: 106 BPM | SYSTOLIC BLOOD PRESSURE: 120 MMHG | BODY MASS INDEX: 27.38 KG/M2 | WEIGHT: 145 LBS | DIASTOLIC BLOOD PRESSURE: 80 MMHG

## 2021-06-15 DIAGNOSIS — R53.83 OTHER FATIGUE: ICD-10-CM

## 2021-06-15 LAB — GLUCOSE BLDC GLUCOMTR-MCNC: 128

## 2021-06-15 PROCEDURE — 99214 OFFICE O/P EST MOD 30 MIN: CPT | Mod: 25

## 2021-06-15 PROCEDURE — 99072 ADDL SUPL MATRL&STAF TM PHE: CPT

## 2021-06-15 PROCEDURE — 82962 GLUCOSE BLOOD TEST: CPT

## 2021-06-15 NOTE — ASSESSMENT
[FreeTextEntry1] : 50 year old female with poorly controlled Type 1 DM with associated retinopathy and neuropathy, also with hypertension and vitamin D deficiency. \par \par 1. Type 1 DM - unable to make pump changes due to limited data\par -Recommend Dexcom G6.\par -Upgrade to Omnipod DASH.\par -Enter all BG data and carb in take into the pump.\par -Stressed importance of prandial insulin bolus to prevent hyperglycemia.\par -Adjust ISF from 1:100 to 1:90 since correction does not seem to bring BG down.\par -Follow-up with GI to r/o gastroparesis given long standing history of diabetes.\par -Repeat A1c now.\par -Plan to see CDE in the next month once she received Dexcom and Omnipod upgrade.\par \par 2. Hypertension- controlled.\par \par 3. Vitamin D deficiency\par -Continue supplement\par -Repeat level now.\par \par 4. Fatigue, nausea, weakness, depression\par -Check TFTs now.\par -Check AM cortisol.\par -Check CBC, CMP, iron, TIBC, and vitamin B.\par

## 2021-06-15 NOTE — REVIEW OF SYSTEMS
[Fatigue] : fatigue [Nausea] : nausea [Myalgia] : myalgia  [Depression] : depression [Recent Weight Gain (___ Lbs)] : no recent weight gain [Recent Weight Loss (___ Lbs)] : no recent weight loss [Blurred Vision] : no blurred vision [Dysphagia] : no dysphagia [Chest Pain] : no chest pain [Neck Pain] : no neck pain [Palpitations] : no palpitations [Shortness Of Breath] : no shortness of breath [Polyuria] : no polyuria [Dry Skin] : no dry skin [Hair Loss] : no hair loss [Pain/Numbness of Digits] : no pain/numbness of digits [Polydipsia] : no polydipsia [FreeTextEntry2] : malaise

## 2021-06-15 NOTE — HISTORY OF PRESENT ILLNESS
[FreeTextEntry1] : Hospitalized at [facility] in 2021 and 2021 for episodes of abdominal pain and vomiting. Mild DKA and possible sepsis.\par Saw PCP for depression. Feels tired and lack of energy.\par \par Type 1 DM\par Severity: poor control\par Duration: since 9 years old\par Associated symptoms: retinopathy, neuropathy, hypoglycemia\par Modifying factors: better with insulin, CSII\par \par Current meds for glycemic control:\par Omnipod with Novolog\par Submitted RX for upgrade to Omnipod DASH and Dexcom G6.\par Reports frequent SMBG. Cannot use PDM for BS as it does not work \par Using Contour next and One touch Ultra \par Denies hypoglycemia.\par Changes infusion site every 3 days.\par \par Reviewed pump download. Enters BG into pump infrequently, 1-3x daily. If multiple readings in one day, they are usually 1-2 hours apart. Never enters carb intake. Only takes bolus for BG, and does not take correction at the time of meals.\par Reviewed meter. BG is high, 200s to 300s.\par Current B, had coffee earlier.\par \par Last eye exam: 2021, history of DR s/p laser treatment. Denies vision changes.\par Last foot exam: 2019, denies neuropathy.\par \par Diet: appetite varies, still with nausea and vomiting at least once per year\par Weight: stable, unable to lose weight.\par Exercise:  once per week

## 2021-07-27 ENCOUNTER — APPOINTMENT (OUTPATIENT)
Dept: ENDOCRINOLOGY | Facility: CLINIC | Age: 51
End: 2021-07-27

## 2021-10-22 ENCOUNTER — APPOINTMENT (OUTPATIENT)
Dept: ENDOCRINOLOGY | Facility: CLINIC | Age: 51
End: 2021-10-22
Payer: COMMERCIAL

## 2021-10-22 VITALS
HEIGHT: 61 IN | SYSTOLIC BLOOD PRESSURE: 112 MMHG | BODY MASS INDEX: 27.75 KG/M2 | DIASTOLIC BLOOD PRESSURE: 64 MMHG | WEIGHT: 147 LBS | HEART RATE: 90 BPM

## 2021-10-22 LAB
GLUCOSE BLDC GLUCOMTR-MCNC: 245
LDLC SERPL DIRECT ASSAY-MCNC: 79

## 2021-10-22 PROCEDURE — 82962 GLUCOSE BLOOD TEST: CPT

## 2021-10-22 PROCEDURE — 99214 OFFICE O/P EST MOD 30 MIN: CPT | Mod: 25

## 2021-10-22 NOTE — ASSESSMENT
[FreeTextEntry1] : 51 year old female with poorly controlled Type 1 DM with associated retinopathy and neuropathy, also with hypertension and vitamin D deficiency. \par \par 1. Type 1 DM - hyperglycemia due to lack of prandial insulin bolus and inappropriate pump use.\par -Recommend Dexcom G6.\par -Needs training on Omnipod DASH.\par -Enter all BG data and carb intake into the pump.\par -For now, will increase basal rate at midnight to 0.7 units/hr and at 6am to 0.9 units/hr.\par -Stressed importance of prandial insulin bolus to prevent hyperglycemia.\par -Follow-up with GI to r/o gastroparesis given long standing history of diabetes.\par -Repeat A1c now.\par \par 2. Hypertension- controlled.\par \par 3. Vitamin D deficiency\par -Continue supplement\par -Repeat level now.

## 2021-10-22 NOTE — HISTORY OF PRESENT ILLNESS
[FreeTextEntry1] : Hospitalized at [facility] in 2021 and 2021 for episodes of abdominal pain and vomiting. Mild DKA and possible sepsis.\par Saw PCP for depression. Feels tired and lack of energy, although she notes mild improvement in symptoms recently.\par \par Type 1 DM\par Severity: poor control\par Duration: since 9 years old\par Associated symptoms: retinopathy, neuropathy, hypoglycemia\par Modifying factors: better with insulin, CSII\par \par Current meds for glycemic control:\par Omnipod with Novolog\par Changes infusion site every 3 days.\par \par SMBG frequently. Reports BG is usually in the 200s, higher when she has her menses.\par Cannot use PDM for BS as it does not work \par Using Contour next and One touch Ultra \par Denies hypoglycemia.\par \par Reviewed pump download. Persistent hyperglycemia, 200s to 300s, and then up to 400s and 500s when she has menses. \par Enters BG into pump more frequently than in the past but never enters carbs. Usually gives herself a manual bolus, often only 1-2 units so is only getting a correction and is not getting any insulin for meal coverage.\par No meter available today.\par Current B\par \par Last eye exam: 2021, history of  s/p laser treatment. Denies vision changes.\par Last foot exam: 2019, denies neuropathy.\par Kidney: none \par Heart: none\par \par Diet: trying to carb count\par Weight: gained two pounds since last visit\par Exercise: goes to gym 3x per month

## 2021-10-22 NOTE — REVIEW OF SYSTEMS
[Recent Weight Gain (___ Lbs)] : recent weight gain: [unfilled] lbs [Pain/Numbness of Digits] : pain/numbness of digits [Blurred Vision] : no blurred vision [Chest Pain] : no chest pain [Palpitations] : no palpitations [Shortness Of Breath] : no shortness of breath [Nausea] : no nausea [Abdominal Pain] : no abdominal pain [Vomiting] : no vomiting [Polyuria] : no polyuria [Polydipsia] : no polydipsia

## 2021-10-22 NOTE — PHYSICAL EXAM
[Alert] : alert [Well Nourished] : well nourished [No Acute Distress] : no acute distress [Well Developed] : well developed [Normal Sclera/Conjunctiva] : normal sclera/conjunctiva [EOMI] : extra ocular movement intact [No Proptosis] : no proptosis [Normal Oropharynx] : the oropharynx was normal [Thyroid Not Enlarged] : the thyroid was not enlarged [No Thyroid Nodules] : no palpable thyroid nodules [No Respiratory Distress] : no respiratory distress [No Accessory Muscle Use] : no accessory muscle use [Clear to Auscultation] : lungs were clear to auscultation bilaterally [Normal S1, S2] : normal S1 and S2 [Normal Rate] : heart rate was normal [Regular Rhythm] : with a regular rhythm [No Edema] : no peripheral edema [Normal Anterior Cervical Nodes] : no anterior cervical lymphadenopathy [Normal Gait] : normal gait [Normal Strength/Tone] : muscle strength and tone were normal [No Rash] : no rash [Oriented x3] : oriented to person, place, and time [Normal Affect] : the affect was normal [Normal Mood] : the mood was normal [Acanthosis Nigricans] : no acanthosis nigricans

## 2021-11-09 ENCOUNTER — APPOINTMENT (OUTPATIENT)
Dept: ENDOCRINOLOGY | Facility: CLINIC | Age: 51
End: 2021-11-09

## 2021-12-10 ENCOUNTER — RX RENEWAL (OUTPATIENT)
Age: 51
End: 2021-12-10

## 2022-01-19 ENCOUNTER — LABORATORY RESULT (OUTPATIENT)
Age: 52
End: 2022-01-19

## 2022-01-21 ENCOUNTER — APPOINTMENT (OUTPATIENT)
Dept: ENDOCRINOLOGY | Facility: CLINIC | Age: 52
End: 2022-01-21

## 2022-02-11 ENCOUNTER — APPOINTMENT (OUTPATIENT)
Dept: ENDOCRINOLOGY | Facility: CLINIC | Age: 52
End: 2022-02-11

## 2022-02-22 ENCOUNTER — APPOINTMENT (OUTPATIENT)
Dept: ENDOCRINOLOGY | Facility: CLINIC | Age: 52
End: 2022-02-22

## 2022-03-30 ENCOUNTER — APPOINTMENT (OUTPATIENT)
Dept: ENDOCRINOLOGY | Facility: CLINIC | Age: 52
End: 2022-03-30

## 2022-04-14 ENCOUNTER — APPOINTMENT (OUTPATIENT)
Dept: ENDOCRINOLOGY | Facility: CLINIC | Age: 52
End: 2022-04-14
Payer: COMMERCIAL

## 2022-04-14 VITALS
WEIGHT: 153 LBS | SYSTOLIC BLOOD PRESSURE: 116 MMHG | HEART RATE: 108 BPM | DIASTOLIC BLOOD PRESSURE: 78 MMHG | HEIGHT: 61 IN | BODY MASS INDEX: 28.89 KG/M2

## 2022-04-14 PROCEDURE — 82962 GLUCOSE BLOOD TEST: CPT

## 2022-04-14 PROCEDURE — 99214 OFFICE O/P EST MOD 30 MIN: CPT | Mod: 25

## 2022-04-15 ENCOUNTER — NON-APPOINTMENT (OUTPATIENT)
Age: 52
End: 2022-04-15

## 2022-04-15 LAB — GLUCOSE BLDC GLUCOMTR-MCNC: 126

## 2022-04-25 ENCOUNTER — APPOINTMENT (OUTPATIENT)
Dept: ENDOCRINOLOGY | Facility: CLINIC | Age: 52
End: 2022-04-25

## 2022-05-01 NOTE — ASSESSMENT
[FreeTextEntry1] : 51 year old female with poorly controlled Type 1 DM with associated retinopathy and neuropathy, also with hypertension and vitamin D deficiency. \par \par 1. Type 1 DM - hyperglycemia due to lack of prandial insulin bolus and inappropriate pump use.\par -Recommend Dexcom G6.\par -Needs training on Omnipod DASH.-\par -Enter all BG data and carb intake in needs new  PDM t\par -Follow-up with GI to r/o gastroparesis given long standing history of diabetes.\par -Repeat A1c now.\par \par 2. Hypertension- controlled.\par \par 3. Vitamin D deficiency\par -Continue supplement\par -Repeat level now.

## 2022-05-01 NOTE — HISTORY OF PRESENT ILLNESS
[FreeTextEntry1] : Hospitalized at [facility] in 2021 and 2021 for episodes of abdominal pain and vomiting HAs been off  OmnipOd bc broke\par \par  Suinfg basal bolus \par Basaglar 15 untis at [facility] \par Novolog  using up to 10 untis  tid ac   using CHO ratio\par Type 1 DM\par Severity: poor control\par Duration: since 9 years old\par Associated symptoms: retinopathy, neuropathy, hypoglycemia\par Modifying factors: better with insulin, CSII\par \par Current meds for glycemic control:\par Omnipod with Novolog\par Changes infusion site every 3 days.\par \par SMBG frequently. Reports BG is usually in the 200s, higher when she has her menses.\par Cannot use PDM for BS as it does not work \par Using Contour next and One touch Ultra \par Denies hypoglycemia.\par \par Reviewed pump download. Persistent hyperglycemia, 200s to 300s, and then up to 400s and 500s when she has menses. \par Enters BG into pump more frequently than in the past but never enters carbs. Usually gives herself a manual bolus, often only 1-2 units so is only getting a correction and is not getting any insulin for meal coverage.\par No meter available today.\par Current B\par \par Last eye exam: 2021, history of DR s/p laser treatment. Denies vision changes.\par Last foot exam: 2019, denies neuropathy.\par Kidney: none \par Heart: none\par \par Diet: trying to carb count\par Weight: gained two pounds since last visit\par Exercise: goes to gym 3x per month

## 2022-06-14 RX ORDER — SYRINGE-NEEDLE,INSULIN,0.5 ML 31 GX5/16"
31G X 5/16" SYRINGE, EMPTY DISPOSABLE MISCELLANEOUS
Qty: 50 | Refills: 0 | Status: ACTIVE | COMMUNITY
Start: 2022-03-26 | End: 1900-01-01

## 2022-06-28 ENCOUNTER — APPOINTMENT (OUTPATIENT)
Dept: ENDOCRINOLOGY | Facility: CLINIC | Age: 52
End: 2022-06-28

## 2022-09-01 ENCOUNTER — APPOINTMENT (OUTPATIENT)
Dept: ENDOCRINOLOGY | Facility: CLINIC | Age: 52
End: 2022-09-01

## 2022-09-01 ENCOUNTER — LABORATORY RESULT (OUTPATIENT)
Age: 52
End: 2022-09-01

## 2022-09-01 VITALS
HEART RATE: 100 BPM | DIASTOLIC BLOOD PRESSURE: 70 MMHG | SYSTOLIC BLOOD PRESSURE: 118 MMHG | BODY MASS INDEX: 30.78 KG/M2 | WEIGHT: 163 LBS | HEIGHT: 61 IN

## 2022-09-01 LAB — GLUCOSE BLDC GLUCOMTR-MCNC: 225

## 2022-09-01 PROCEDURE — 99215 OFFICE O/P EST HI 40 MIN: CPT | Mod: 25

## 2022-09-01 PROCEDURE — 82962 GLUCOSE BLOOD TEST: CPT

## 2022-09-01 RX ORDER — ALPRAZOLAM 2 MG/1
2 TABLET ORAL
Qty: 90 | Refills: 0 | Status: ACTIVE | COMMUNITY

## 2022-09-01 RX ORDER — MULTIVIT-MIN/IRON/FOLIC ACID/K 18-600-40
500 CAPSULE ORAL DAILY
Refills: 0 | Status: DISCONTINUED | COMMUNITY
End: 2022-09-01

## 2022-09-01 RX ORDER — BACILLUS COAGULANS/INULIN 1B-250 MG
CAPSULE ORAL
Refills: 0 | Status: DISCONTINUED | COMMUNITY
End: 2022-09-01

## 2022-09-01 RX ORDER — VITAMIN E ACETATE 670 MG
CAPSULE ORAL DAILY
Refills: 0 | Status: DISCONTINUED | COMMUNITY
End: 2022-09-01

## 2022-09-01 RX ORDER — DIAZEPAM 5 MG/1
5 TABLET ORAL
Qty: 90 | Refills: 0 | Status: ACTIVE | COMMUNITY

## 2022-09-05 NOTE — HISTORY OF PRESENT ILLNESS
[FreeTextEntry1] : off OMniPOd\par  has some trouble with finacnes- can either get Dexcom or OmniPOd \par \par \par Basaglar 15 untis at [facility] \par Novolog  using up to 10 untis  tid ac   cannot reall CHO ratio so has been guessing \par Type 1 DM\par Severity: poor control\par Duration: since 9 years old\par Associated symptoms: retinopathy, neuropathy, hypoglycemia\par Modifying factors: better with insulin, CSII\par \par Current meds for glycemic control:\par Omnipod with Novolog\par Changes infusion site every 3 days.\par \par SMBG frequently. Reports BG is usually in the 200s, higher when she has her menses.\par \par Using Contour next and One touch Ultra \par Denies hypoglycemia.\par \par \par Last eye exam: January 2021, history of DR s/p laser treatment. Denies vision changes.\par Last foot exam: 2019, denies neuropathy.\par Kidney: none \par Heart: none\par \par Diet: trying to carb count\par Weight: gained two pounds since last visit\par Exercise: goes to gym 3x per month

## 2022-09-05 NOTE — REVIEW OF SYSTEMS
[Recent Weight Gain (___ Lbs)] : recent weight gain: [unfilled] lbs [Blurred Vision] : no blurred vision [Chest Pain] : no chest pain [Palpitations] : no palpitations [Shortness Of Breath] : no shortness of breath [Nausea] : no nausea [Abdominal Pain] : no abdominal pain [Vomiting] : no vomiting [Polyuria] : no polyuria [Pain/Numbness of Digits] : pain/numbness of digits [Polydipsia] : no polydipsia [de-identified] : some weakness in her hands

## 2022-09-05 NOTE — PHYSICAL EXAM
[Alert] : alert [Well Nourished] : well nourished [No Acute Distress] : no acute distress [Well Developed] : well developed [Normal Sclera/Conjunctiva] : normal sclera/conjunctiva [EOMI] : extra ocular movement intact [No Proptosis] : no proptosis [Normal Oropharynx] : the oropharynx was normal [Thyroid Not Enlarged] : the thyroid was not enlarged [No Thyroid Nodules] : no palpable thyroid nodules [No Respiratory Distress] : no respiratory distress [No Accessory Muscle Use] : no accessory muscle use [Clear to Auscultation] : lungs were clear to auscultation bilaterally [Normal S1, S2] : normal S1 and S2 [Normal Rate] : heart rate was normal [Regular Rhythm] : with a regular rhythm [No Edema] : no peripheral edema [Normal Anterior Cervical Nodes] : no anterior cervical lymphadenopathy [Normal Gait] : normal gait [Normal Strength/Tone] : muscle strength and tone were normal [No Rash] : no rash [Acanthosis Nigricans] : no acanthosis nigricans [Oriented x3] : oriented to person, place, and time [Normal Affect] : the affect was normal [Normal Mood] : the mood was normal

## 2022-09-05 NOTE — ASSESSMENT
[FreeTextEntry1] : 51 year old female with poorly controlled Type 1 DM with associated retinopathy and neuropathy, also with hypertension and vitamin D deficiency. \par \par 1. Type 1 DM - hyperglycemia due to lack of prandial insulin bolus ae.\par -Recommend Dexcom G6.\par -cannot  afford DASH\par Keep BASaglar 18 untis\par  CHO- use 1:9 raito\par reveiwd CF  goal 120\par \par pt will ocme in to meet with RD DE \par \par \par -Follow-up with GI to r/o gastroparesis given long standing history of diabetes.\par -Repeat A1c now.\par \par 2. Hypertension- controlled.\par \par 3. Vitamin D deficiency\par -Continue supplement\par -Repeat level now.\par \par 4. Hands  see Neurology

## 2022-09-12 LAB
25(OH)D3 SERPL-MCNC: 47 NG/ML
A PHAGOCYTOPH IGG TITR SER IF: NORMAL TITER
ALBUMIN SERPL ELPH-MCNC: 4 G/DL
ALP BLD-CCNC: 111 U/L
ALT SERPL-CCNC: 15 U/L
ANION GAP SERPL CALC-SCNC: 11 MMOL/L
APPEARANCE: ABNORMAL
AST SERPL-CCNC: 17 U/L
B BURGDOR AB SER QL IA: NEGATIVE
B MICROTI IGG TITR SER: NORMAL TITER
BASOPHILS # BLD AUTO: 0.06 K/UL
BASOPHILS NFR BLD AUTO: 1 %
BILIRUB SERPL-MCNC: 0.2 MG/DL
BILIRUBIN URINE: NEGATIVE
BLOOD URINE: NEGATIVE
BUN SERPL-MCNC: 11 MG/DL
CALCIUM SERPL-MCNC: 9.2 MG/DL
CHLORIDE SERPL-SCNC: 99 MMOL/L
CHOLEST SERPL-MCNC: 221 MG/DL
CO2 SERPL-SCNC: 28 MMOL/L
COLOR: YELLOW
CREAT SERPL-MCNC: 0.63 MG/DL
CREAT SPEC-SCNC: 139 MG/DL
E CHAFFEENSIS IGG TITR SER IF: NORMAL TITER
EGFR: 107 ML/MIN/1.73M2
EOSINOPHIL # BLD AUTO: 0.21 K/UL
EOSINOPHIL NFR BLD AUTO: 3.4 %
ESTIMATED AVERAGE GLUCOSE: 217 MG/DL
FERRITIN SERPL-MCNC: 11 NG/ML
GLUCOSE QUALITATIVE U: ABNORMAL
GLUCOSE SERPL-MCNC: 264 MG/DL
HBA1C MFR BLD HPLC: 9.2 %
HCT VFR BLD CALC: 39 %
HDLC SERPL-MCNC: 96 MG/DL
HGB BLD-MCNC: 11.8 G/DL
IMM GRANULOCYTES NFR BLD AUTO: 0.3 %
IRON SATN MFR SERPL: 7 %
IRON SERPL-MCNC: 30 UG/DL
KETONES URINE: NEGATIVE
LDLC SERPL CALC-MCNC: 107 MG/DL
LEUKOCYTE ESTERASE URINE: NEGATIVE
LYMPHOCYTES # BLD AUTO: 1.14 K/UL
LYMPHOCYTES NFR BLD AUTO: 18.6 %
MAGNESIUM SERPL-MCNC: 1.5 MG/DL
MAN DIFF?: NORMAL
MCHC RBC-ENTMCNC: 26.2 PG
MCHC RBC-ENTMCNC: 30.3 GM/DL
MCV RBC AUTO: 86.5 FL
MICROALBUMIN 24H UR DL<=1MG/L-MCNC: 4.2 MG/DL
MICROALBUMIN/CREAT 24H UR-RTO: 30 MG/G
MONOCYTES # BLD AUTO: 0.52 K/UL
MONOCYTES NFR BLD AUTO: 8.5 %
NEUTROPHILS # BLD AUTO: 4.19 K/UL
NEUTROPHILS NFR BLD AUTO: 68.2 %
NITRITE URINE: NEGATIVE
NONHDLC SERPL-MCNC: 124 MG/DL
PH URINE: 6
PHOSPHATE SERPL-MCNC: 3.2 MG/DL
PLATELET # BLD AUTO: 397 K/UL
POTASSIUM SERPL-SCNC: 4.8 MMOL/L
PROT SERPL-MCNC: 7 G/DL
PROTEIN URINE: NORMAL
RBC # BLD: 4.51 M/UL
RBC # FLD: 12.8 %
SODIUM SERPL-SCNC: 138 MMOL/L
SPECIFIC GRAVITY URINE: 1.03
T3FREE SERPL-MCNC: 2.4 PG/ML
T4 FREE SERPL-MCNC: 1 NG/DL
TIBC SERPL-MCNC: 434 UG/DL
TRIGL SERPL-MCNC: 85 MG/DL
TSH SERPL-ACNC: 1.25 UIU/ML
UIBC SERPL-MCNC: 404 UG/DL
UROBILINOGEN URINE: NORMAL
VIT B12 SERPL-MCNC: 866 PG/ML
WBC # FLD AUTO: 6.14 K/UL

## 2022-10-11 ENCOUNTER — APPOINTMENT (OUTPATIENT)
Dept: ENDOCRINOLOGY | Facility: CLINIC | Age: 52
End: 2022-10-11

## 2022-12-09 ENCOUNTER — APPOINTMENT (OUTPATIENT)
Dept: ENDOCRINOLOGY | Facility: CLINIC | Age: 52
End: 2022-12-09

## 2023-01-03 RX ORDER — INSULIN GLARGINE 100 [IU]/ML
100 INJECTION, SOLUTION SUBCUTANEOUS
Qty: 1 | Refills: 0 | Status: DISCONTINUED | COMMUNITY
Start: 1900-01-01 | End: 2023-01-03

## 2023-01-14 ENCOUNTER — OUTPATIENT (OUTPATIENT)
Dept: OUTPATIENT SERVICES | Facility: HOSPITAL | Age: 53
LOS: 1 days | End: 2023-01-14

## 2023-01-14 DIAGNOSIS — D50.9 IRON DEFICIENCY ANEMIA, UNSPECIFIED: ICD-10-CM

## 2023-01-15 PROBLEM — D50.8 OTHER IRON DEFICIENCY ANEMIA: Status: ACTIVE | Noted: 2018-10-23

## 2023-01-17 ENCOUNTER — APPOINTMENT (OUTPATIENT)
Age: 53
End: 2023-01-17

## 2023-01-17 DIAGNOSIS — D50.8 OTHER IRON DEFICIENCY ANEMIAS: ICD-10-CM

## 2023-02-28 ENCOUNTER — APPOINTMENT (OUTPATIENT)
Dept: ENDOCRINOLOGY | Facility: CLINIC | Age: 53
End: 2023-02-28
Payer: COMMERCIAL

## 2023-02-28 PROCEDURE — 99214 OFFICE O/P EST MOD 30 MIN: CPT | Mod: 95

## 2023-03-15 NOTE — ASSESSMENT
[FreeTextEntry1] : 51 year old female with poorly controlled Type 1 DM with associated retinopathy and neuropathy, also with hypertension and vitamin D deficiency. \par \par 1. Type 1 DM -never got Dexcom but wants to try Moses 3 -sent RX \par -Recommend Dexcom G6.\par \par Keep BASaglar 18 untis\par  CHO- use 1:9 raito\par reveiwd CF  goal 120\par \par pt will ocme in to meet with RD DE \par \par \par -Follow-up with GI to r/o gastroparesis given long standing history of diabetes.\par -Repeat A1c now.\par \par 2. Hypertension- controlled.\par \par 3. Vitamin D deficiency\par -Continue supplement\par -Repeat level now.\par \par 4.URI- ?strep vs flu vs COvid- advised pt to go to walk in clinic \par \par get lasb doen when she is feeling ebtter

## 2023-03-15 NOTE — PHYSICAL EXAM
[Alert] : alert [Well Nourished] : well nourished [No Acute Distress] : no acute distress [Well Developed] : well developed [Normal Sclera/Conjunctiva] : normal sclera/conjunctiva [EOMI] : extra ocular movement intact [No Proptosis] : no proptosis [Thyroid Not Enlarged] : the thyroid was not enlarged [Normal Oropharynx] : the oropharynx was normal [No Thyroid Nodules] : no palpable thyroid nodules [No Respiratory Distress] : no respiratory distress [No Accessory Muscle Use] : no accessory muscle use [Clear to Auscultation] : lungs were clear to auscultation bilaterally [Normal S1, S2] : normal S1 and S2 [Normal Rate] : heart rate was normal [Regular Rhythm] : with a regular rhythm [No Edema] : no peripheral edema [Normal Anterior Cervical Nodes] : no anterior cervical lymphadenopathy [Normal Gait] : normal gait [Normal Strength/Tone] : muscle strength and tone were normal [No Rash] : no rash [Oriented x3] : oriented to person, place, and time [Normal Affect] : the affect was normal [Normal Mood] : the mood was normal [Acanthosis Nigricans] : no acanthosis nigricans

## 2023-03-15 NOTE — HISTORY OF PRESENT ILLNESS
[Home] : at home, [unfilled] , at the time of the visit. [Medical Office: ([facility])___] : at the medical office located in  [Verbal consent obtained from patient] : the patient, [unfilled] [FreeTextEntry1] : TEB \par start time 145 pm end time 205 pm \par \par pt doing TEB vc sick with cold/sore throat  - diid not get tested for COvid or strep yet \par Basaglar 15 untis at [facility] \par Novolog  using up to 10 untis  tid ac    \par Type 1 DM\par Severity: poor control\par Duration: since 9 years old\par Associated symptoms: retinopathy, neuropathy, hypoglycemia\par Modifying factors: better with insulin, CSII\par \par Current meds for glycemic control:\par Omnipod with Novolog\par Changes infusion site every 3 days.\par \par SMBG frequently. Reports BG is usually in the 200s, higher when she has her menses.\par \par Using Contour next and One touch Ultra \par Denies hypoglycemia.\par \par \par Last eye exam: January 2021, history of DR s/p laser treatment. Denies vision changes.\par Last foot exam: 2019, denies neuropathy.\par Kidney: none \par Heart: none\par \par Diet: trying to carb count\par Weight: gained two pounds since last visit\par Exercise: goes to gym 3x per month

## 2023-03-15 NOTE — REVIEW OF SYSTEMS
[Recent Weight Gain (___ Lbs)] : recent weight gain: [unfilled] lbs [Pain/Numbness of Digits] : pain/numbness of digits [Blurred Vision] : no blurred vision [Chest Pain] : no chest pain [Palpitations] : no palpitations [Shortness Of Breath] : no shortness of breath [Nausea] : no nausea [Abdominal Pain] : no abdominal pain [Vomiting] : no vomiting [Polyuria] : no polyuria [Polydipsia] : no polydipsia [de-identified] : some weakness in her hands

## 2023-06-16 ENCOUNTER — APPOINTMENT (OUTPATIENT)
Dept: ENDOCRINOLOGY | Facility: CLINIC | Age: 53
End: 2023-06-16
Payer: COMMERCIAL

## 2023-06-16 VITALS
HEART RATE: 90 BPM | SYSTOLIC BLOOD PRESSURE: 110 MMHG | BODY MASS INDEX: 30.4 KG/M2 | HEIGHT: 61 IN | OXYGEN SATURATION: 98 % | DIASTOLIC BLOOD PRESSURE: 80 MMHG | WEIGHT: 161 LBS

## 2023-06-16 DIAGNOSIS — E55.9 VITAMIN D DEFICIENCY, UNSPECIFIED: ICD-10-CM

## 2023-06-16 DIAGNOSIS — I10 ESSENTIAL (PRIMARY) HYPERTENSION: ICD-10-CM

## 2023-06-16 LAB — GLUCOSE BLDC GLUCOMTR-MCNC: 211

## 2023-06-16 PROCEDURE — 82962 GLUCOSE BLOOD TEST: CPT

## 2023-06-16 PROCEDURE — 99214 OFFICE O/P EST MOD 30 MIN: CPT | Mod: 25

## 2023-06-16 RX ORDER — BLOOD SUGAR DIAGNOSTIC
STRIP MISCELLANEOUS
Qty: 400 | Refills: 1 | Status: DISCONTINUED | COMMUNITY
Start: 2020-10-02 | End: 2023-06-16

## 2023-06-16 RX ORDER — BLOOD-GLUCOSE,RECEIVER,CONT
EACH MISCELLANEOUS
Qty: 1 | Refills: 0 | Status: DISCONTINUED | COMMUNITY
Start: 2021-05-05 | End: 2023-06-16

## 2023-06-16 RX ORDER — BLOOD-GLUCOSE SENSOR
EACH MISCELLANEOUS
Qty: 3 | Refills: 2 | Status: DISCONTINUED | COMMUNITY
Start: 2021-05-05 | End: 2023-06-16

## 2023-06-16 RX ORDER — DEXTROAMPHETAMINE SULFATE, DEXTROAMPHETAMINE SACCHARATE, AMPHETAMINE SULFATE AND AMPHETAMINE ASPARTATE 6.25; 6.25; 6.25; 6.25 MG/1; MG/1; MG/1; MG/1
25 CAPSULE, EXTENDED RELEASE ORAL DAILY
Refills: 0 | Status: DISCONTINUED | COMMUNITY
End: 2023-06-16

## 2023-06-16 RX ORDER — BLOOD-GLUCOSE TRANSMITTER
EACH MISCELLANEOUS
Qty: 1 | Refills: 2 | Status: DISCONTINUED | COMMUNITY
Start: 2021-05-05 | End: 2023-06-16

## 2023-06-16 NOTE — ASSESSMENT
[FreeTextEntry1] : 52 year old female with poorly controlled Type 1 DM with associated retinopathy and neuropathy, also with hypertension and vitamin D deficiency. \par \par 1. Type 1 DM- variable control last month, no new data now. "Guesses" insulin dose for meal and often takes after the meal because her appetite has changed (decreased) since starting Concerta.\par -Continue Tresiba and Novolog.\par -Continue SMBG with CGM. Stressed importance of entering events (meals, exercise, timing and dose of insulin) in CGM. Will download in 2 weeks and adjust insulin doses at that time.\par -Was using Veduca 1:9, can review with patient after CGMS download.\par -Follow-up with GI to r/o gastroparesis given long standing history of diabetes and episodes of nausea/vomiting (although seem to be unrelated to timing of meals).\par -Routine cardiology evaluation.\par -Eye exam due September 2023.\par -Repeat A1c now.\par \par 2. Hypertension- controlled.\par \par 3. Vitamin D deficiency\par -Continue supplement\par -Repeat level now.

## 2023-06-16 NOTE — REVIEW OF SYSTEMS
3 = A little assistance [Recent Weight Loss (___ Lbs)] : recent weight loss: [unfilled] lbs [Nausea] : nausea [Pain/Numbness of Digits] : pain/numbness of digits [Chest Pain] : no chest pain [Palpitations] : no palpitations [Shortness Of Breath] : no shortness of breath [Abdominal Pain] : no abdominal pain [Vomiting] : no vomiting [Polyuria] : no polyuria [Polydipsia] : no polydipsia

## 2023-06-16 NOTE — HISTORY OF PRESENT ILLNESS
[FreeTextEntry1] : Type 1 DM\par Severity: poor control\par Duration: since 9 years old\par Associated symptoms: retinopathy, neuropathy, hypoglycemia\par Modifying factors: better with insulin, CSII\par \par Current meds for glycemic control:\par Omnipod with Novolog--->off due to financial reasons.\par Tresiba 18 untis at [facility] \par Novolog 3-10 units before or after each meal--->sometimes doesn't finish meal so takes after due to concern for hypoglycemia. \par \par Was SMBG with Freestyle Moses but CGM keeps falling off, no data today.\par Reviewed Moses olaf on phone- has not worn sensor for the past month. When she was wearing it, BG was highly variable at the time. She just placed new sensor today.\par SMBG with FS up to 3x daily. \par Using Contour next and One touch Ultra.\par Reports BG has been 100s to 200s. No hypoglycemia.\par Current , last ate 5 hours ago\par \par Last eye exam: September 2022, history of severe PDR s/p laser treatment.\par Last foot exam: January 2023, denies neuropathy.\par Kidney disease: none \par Heart disease: none\par \par Diet: trying to carb count, decreased appetite since starting Concerta.\par Weight: lost 10 pounds, weight was up to 172 at home.\par Exercise: none\par

## 2023-06-17 LAB
25(OH)D3 SERPL-MCNC: 75.5 NG/ML
ALBUMIN SERPL ELPH-MCNC: 4.1 G/DL
ALP BLD-CCNC: 77 U/L
ALT SERPL-CCNC: 12 U/L
ANION GAP SERPL CALC-SCNC: 8 MMOL/L
AST SERPL-CCNC: 14 U/L
BILIRUB SERPL-MCNC: 0.3 MG/DL
BUN SERPL-MCNC: 10 MG/DL
CALCIUM SERPL-MCNC: 9.8 MG/DL
CHLORIDE SERPL-SCNC: 98 MMOL/L
CHOLEST SERPL-MCNC: 180 MG/DL
CO2 SERPL-SCNC: 30 MMOL/L
CREAT SERPL-MCNC: 0.73 MG/DL
EGFR: 100 ML/MIN/1.73M2
ESTIMATED AVERAGE GLUCOSE: 194 MG/DL
FERRITIN SERPL-MCNC: 26 NG/ML
GLUCOSE SERPL-MCNC: 215 MG/DL
HBA1C MFR BLD HPLC: 8.4 %
HDLC SERPL-MCNC: 69 MG/DL
IRON SATN MFR SERPL: 25 %
IRON SERPL-MCNC: 72 UG/DL
LDLC SERPL CALC-MCNC: 98 MG/DL
NONHDLC SERPL-MCNC: 111 MG/DL
POTASSIUM SERPL-SCNC: 4.6 MMOL/L
PROT SERPL-MCNC: 6.8 G/DL
SODIUM SERPL-SCNC: 137 MMOL/L
T4 FREE SERPL-MCNC: 1.3 NG/DL
TIBC SERPL-MCNC: 294 UG/DL
TRIGL SERPL-MCNC: 63 MG/DL
TSH SERPL-ACNC: 1.16 UIU/ML
UIBC SERPL-MCNC: 221 UG/DL
VIT B12 SERPL-MCNC: 1316 PG/ML

## 2023-06-21 ENCOUNTER — NON-APPOINTMENT (OUTPATIENT)
Age: 53
End: 2023-06-21

## 2023-07-12 ENCOUNTER — NON-APPOINTMENT (OUTPATIENT)
Age: 53
End: 2023-07-12

## 2023-07-14 ENCOUNTER — NON-APPOINTMENT (OUTPATIENT)
Age: 53
End: 2023-07-14

## 2023-07-19 ENCOUNTER — NON-APPOINTMENT (OUTPATIENT)
Age: 53
End: 2023-07-19

## 2023-10-23 ENCOUNTER — APPOINTMENT (OUTPATIENT)
Dept: ENDOCRINOLOGY | Facility: CLINIC | Age: 53
End: 2023-10-23
Payer: COMMERCIAL

## 2023-10-23 VITALS
SYSTOLIC BLOOD PRESSURE: 125 MMHG | HEIGHT: 61 IN | WEIGHT: 163 LBS | HEART RATE: 92 BPM | BODY MASS INDEX: 30.78 KG/M2 | DIASTOLIC BLOOD PRESSURE: 80 MMHG | OXYGEN SATURATION: 97 %

## 2023-10-23 PROCEDURE — 82962 GLUCOSE BLOOD TEST: CPT

## 2023-10-23 PROCEDURE — 99214 OFFICE O/P EST MOD 30 MIN: CPT

## 2023-10-23 RX ORDER — BLOOD-GLUCOSE SENSOR
EACH MISCELLANEOUS
Qty: 2 | Refills: 5 | Status: ACTIVE | COMMUNITY
Start: 2023-02-28 | End: 1900-01-01

## 2023-10-25 LAB — GLUCOSE BLDC GLUCOMTR-MCNC: 262

## 2023-11-28 ENCOUNTER — APPOINTMENT (OUTPATIENT)
Dept: ENDOCRINOLOGY | Facility: CLINIC | Age: 53
End: 2023-11-28

## 2023-12-05 ENCOUNTER — NON-APPOINTMENT (OUTPATIENT)
Age: 53
End: 2023-12-05

## 2024-01-18 LAB
HBA1C MFR BLD HPLC: 7.9
LDLC SERPL DIRECT ASSAY-MCNC: 89
MICROALBUMIN/CREAT 24H UR-RTO: 22
TSH SERPL-ACNC: 1.58

## 2024-02-26 RX ORDER — INSULIN ASPART 100 [IU]/ML
100 INJECTION, SOLUTION INTRAVENOUS; SUBCUTANEOUS
Qty: 2 | Refills: 0 | Status: ACTIVE | COMMUNITY
Start: 2022-04-14 | End: 1900-01-01

## 2024-02-26 RX ORDER — PEN NEEDLE, DIABETIC 32GX 5/32"
32G X 4 MM NEEDLE, DISPOSABLE MISCELLANEOUS
Qty: 1 | Refills: 1 | Status: ACTIVE | COMMUNITY
Start: 1900-01-01 | End: 1900-01-01

## 2024-02-26 RX ORDER — INSULIN DEGLUDEC INJECTION 100 U/ML
100 INJECTION, SOLUTION SUBCUTANEOUS
Qty: 3 | Refills: 0 | Status: ACTIVE | COMMUNITY
Start: 2023-01-03 | End: 1900-01-01

## 2024-04-05 ENCOUNTER — APPOINTMENT (OUTPATIENT)
Dept: ENDOCRINOLOGY | Facility: CLINIC | Age: 54
End: 2024-04-05

## 2024-04-15 ENCOUNTER — APPOINTMENT (OUTPATIENT)
Dept: ENDOCRINOLOGY | Facility: CLINIC | Age: 54
End: 2024-04-15
Payer: COMMERCIAL

## 2024-04-15 VITALS
HEART RATE: 99 BPM | DIASTOLIC BLOOD PRESSURE: 74 MMHG | WEIGHT: 149 LBS | RESPIRATION RATE: 16 BRPM | HEIGHT: 61 IN | SYSTOLIC BLOOD PRESSURE: 126 MMHG | OXYGEN SATURATION: 99 % | BODY MASS INDEX: 28.13 KG/M2

## 2024-04-15 DIAGNOSIS — E10.65 TYPE 1 DIABETES MELLITUS WITH HYPERGLYCEMIA: ICD-10-CM

## 2024-04-15 LAB — GLUCOSE BLDC GLUCOMTR-MCNC: 205

## 2024-04-15 PROCEDURE — 99214 OFFICE O/P EST MOD 30 MIN: CPT

## 2024-04-15 PROCEDURE — 82962 GLUCOSE BLOOD TEST: CPT

## 2024-04-15 PROCEDURE — 95251 CONT GLUC MNTR ANALYSIS I&R: CPT

## 2024-04-15 RX ORDER — INSULIN ASPART 100 [IU]/ML
100 INJECTION, SOLUTION INTRAVENOUS; SUBCUTANEOUS
Qty: 10 | Refills: 1 | Status: DISCONTINUED | COMMUNITY
End: 2024-04-15

## 2024-04-15 RX ORDER — ALPRAZOLAM 2 MG/1
2 TABLET ORAL 3 TIMES DAILY
Refills: 0 | Status: DISCONTINUED | COMMUNITY
End: 2024-04-15

## 2024-04-15 RX ORDER — LURASIDONE HYDROCHLORIDE 120 MG/1
120 TABLET, FILM COATED ORAL
Refills: 0 | Status: ACTIVE | COMMUNITY

## 2024-04-15 RX ORDER — INSULIN PMP CART,AUT,G6/7,CNTR
EACH SUBCUTANEOUS
Qty: 1 | Refills: 0 | Status: DISCONTINUED | COMMUNITY
Start: 2023-12-07 | End: 2024-04-15

## 2024-04-15 RX ORDER — LUMATEPERONE 21 MG/1
21 CAPSULE ORAL
Qty: 90 | Refills: 0 | Status: DISCONTINUED | COMMUNITY
End: 2024-04-15

## 2024-04-15 RX ORDER — INSULIN PMP CART,AUT,G6/7,CNTR
EACH SUBCUTANEOUS
Qty: 6 | Refills: 1 | Status: DISCONTINUED | COMMUNITY
Start: 2023-12-07 | End: 2024-04-15

## 2024-04-15 NOTE — REVIEW OF SYSTEMS
[Recent Weight Loss (___ Lbs)] : recent weight loss: [unfilled] lbs [Constipation] : constipation [Blurred Vision] : no blurred vision [Palpitations] : no palpitations [Diarrhea] : no diarrhea

## 2024-04-15 NOTE — HISTORY OF PRESENT ILLNESS
[FreeTextEntry1] : Follow up Type 1 DM.   Was previously seeing Dr. Chicas.  Diagnosed with Type 1 DM at age 9. She has struggled to maintain good glycemic control.  She has associated microvascular complications of retinopathy and neuropathy.   Her DM control  improved with insulin pump therapy, but she has stopped due to high cost of supplies and bad results with automation.       SMBG:  prior to CGM, was testing 4 times a day Currently using Libre3 CGM:  Average BG is 189 mg/dl with CV of 44%.  62% of BG within taret range, 2% below target and 46% above target.    Having some morning hypoglycemia.    Current medications for diabetes: Tresiba 16 units qhs Novolog 3-10 units with meals.  (given IR 1:10 and ISF 1:50 with target of 150, but not using this lately).

## 2024-04-15 NOTE — PHYSICAL EXAM
[Healthy Appearance] : healthy appearance [No Acute Distress] : no acute distress [No Neck Mass] : no neck mass was observed [No LAD] : no lymphadenopathy [Supple] : the neck was supple [Thyroid Not Enlarged] : the thyroid was not enlarged [No Thyroid Nodules] : no palpable thyroid nodules [No Respiratory Distress] : no respiratory distress [Clear to Auscultation] : lungs were clear to auscultation bilaterally [Normal S1, S2] : normal S1 and S2 [No Murmurs] : no murmurs [Normal Rate] : heart rate was normal [Regular Rhythm] : with a regular rhythm [No Edema] : no peripheral edema [Normal Affect] : the affect was normal [Normal Insight/Judgement] : insight and judgment were intact [Normal Mood] : the mood was normal [Acanthosis Nigricans] : no acanthosis nigricans

## 2024-04-16 RX ORDER — INSULIN PEN,REUSABLE,BT LISPRO
INSULIN PEN (EA) SUBCUTANEOUS
Qty: 1 | Refills: 0 | Status: ACTIVE | COMMUNITY
Start: 2024-04-16 | End: 1900-01-01

## 2024-04-16 RX ORDER — INSULIN ASPART 100 [IU]/ML
100 INJECTION, SOLUTION INTRAVENOUS; SUBCUTANEOUS
Qty: 2 | Refills: 3 | Status: ACTIVE | COMMUNITY
Start: 2024-04-16 | End: 1900-01-01

## 2024-04-18 LAB
25(OH)D3 SERPL-MCNC: 50.8 NG/ML
ALBUMIN SERPL ELPH-MCNC: 4.1 G/DL
ALP BLD-CCNC: 91 U/L
ALT SERPL-CCNC: 8 U/L
ANION GAP SERPL CALC-SCNC: 17 MMOL/L
AST SERPL-CCNC: 13 U/L
BASOPHILS # BLD AUTO: 0.05 K/UL
BASOPHILS NFR BLD AUTO: 0.8 %
BILIRUB SERPL-MCNC: 0.3 MG/DL
BUN SERPL-MCNC: 7 MG/DL
CALCIUM SERPL-MCNC: 9.6 MG/DL
CHLORIDE SERPL-SCNC: 95 MMOL/L
CHOLEST SERPL-MCNC: 210 MG/DL
CO2 SERPL-SCNC: 26 MMOL/L
CREAT SERPL-MCNC: 0.84 MG/DL
EGFR: 84 ML/MIN/1.73M2
EOSINOPHIL # BLD AUTO: 0.11 K/UL
EOSINOPHIL NFR BLD AUTO: 1.7 %
ESTIMATED AVERAGE GLUCOSE: 183 MG/DL
GLUCOSE SERPL-MCNC: 221 MG/DL
HBA1C MFR BLD HPLC: 8 %
HCT VFR BLD CALC: 41.4 %
HDLC SERPL-MCNC: 62 MG/DL
HGB BLD-MCNC: 12.4 G/DL
IMM GRANULOCYTES NFR BLD AUTO: 0.3 %
LDLC SERPL CALC-MCNC: 134 MG/DL
LYMPHOCYTES # BLD AUTO: 1.16 K/UL
LYMPHOCYTES NFR BLD AUTO: 17.8 %
MAN DIFF?: NORMAL
MCHC RBC-ENTMCNC: 25.7 PG
MCHC RBC-ENTMCNC: 30 GM/DL
MCV RBC AUTO: 85.7 FL
MONOCYTES # BLD AUTO: 0.62 K/UL
MONOCYTES NFR BLD AUTO: 9.5 %
NEUTROPHILS # BLD AUTO: 4.54 K/UL
NEUTROPHILS NFR BLD AUTO: 69.9 %
NONHDLC SERPL-MCNC: 149 MG/DL
PLATELET # BLD AUTO: 550 K/UL
POTASSIUM SERPL-SCNC: 4.3 MMOL/L
PROT SERPL-MCNC: 7 G/DL
RBC # BLD: 4.83 M/UL
RBC # FLD: 13.3 %
SODIUM SERPL-SCNC: 137 MMOL/L
TRIGL SERPL-MCNC: 80 MG/DL
TSH SERPL-ACNC: 1.59 UIU/ML
WBC # FLD AUTO: 6.5 K/UL

## 2024-04-19 ENCOUNTER — NON-APPOINTMENT (OUTPATIENT)
Age: 54
End: 2024-04-19

## 2024-04-19 DIAGNOSIS — E78.5 HYPERLIPIDEMIA, UNSPECIFIED: ICD-10-CM

## 2024-04-19 RX ORDER — ATORVASTATIN CALCIUM 10 MG/1
10 TABLET, FILM COATED ORAL
Qty: 90 | Refills: 1 | Status: ACTIVE | COMMUNITY
Start: 2024-04-19 | End: 1900-01-01

## 2024-05-14 NOTE — ED ADULT NURSE NOTE - NS ED NOTE ABUSE RESPONSE YN
Chief Complaint   Patient presents with    Medication Refill     Last Appointment with Dr. Patric Cruz:  11/1/2023   No future appointments.  
Yes

## 2024-05-29 RX ORDER — BLOOD SUGAR DIAGNOSTIC
STRIP MISCELLANEOUS
Qty: 300 | Refills: 1 | Status: ACTIVE | COMMUNITY
Start: 2022-04-14 | End: 1900-01-01

## 2024-09-18 ENCOUNTER — APPOINTMENT (OUTPATIENT)
Dept: ENDOCRINOLOGY | Facility: CLINIC | Age: 54
End: 2024-09-18

## 2024-10-14 ENCOUNTER — RX RENEWAL (OUTPATIENT)
Age: 54
End: 2024-10-14

## 2024-10-28 ENCOUNTER — RX RENEWAL (OUTPATIENT)
Age: 54
End: 2024-10-28

## 2025-05-08 ENCOUNTER — APPOINTMENT (OUTPATIENT)
Dept: ENDOCRINOLOGY | Facility: CLINIC | Age: 55
End: 2025-05-08

## 2025-07-07 ENCOUNTER — APPOINTMENT (OUTPATIENT)
Dept: ENDOCRINOLOGY | Facility: CLINIC | Age: 55
End: 2025-07-07

## 2025-07-07 ENCOUNTER — RX RENEWAL (OUTPATIENT)
Age: 55
End: 2025-07-07

## 2025-07-07 NOTE — ED PROVIDER NOTE - NS ED MD DISPO DISCHARGE CCDA
Quality 226: Preventive Care And Screening: Tobacco Use: Screening And Cessation Intervention: Patient screened for tobacco use and is an ex/non-smoker
Detail Level: Detailed
n/a
Patient/Caregiver provided printed discharge information.

## 2025-07-10 RX ORDER — BLOOD-GLUCOSE SENSOR
EACH MISCELLANEOUS
Qty: 6 | Refills: 1 | Status: ACTIVE | COMMUNITY
Start: 2025-07-10 | End: 1900-01-01

## 2025-07-25 ENCOUNTER — APPOINTMENT (OUTPATIENT)
Dept: ENDOCRINOLOGY | Facility: CLINIC | Age: 55
End: 2025-07-25
Payer: COMMERCIAL

## 2025-07-25 ENCOUNTER — RESULT CHARGE (OUTPATIENT)
Age: 55
End: 2025-07-25

## 2025-07-25 VITALS
SYSTOLIC BLOOD PRESSURE: 120 MMHG | DIASTOLIC BLOOD PRESSURE: 74 MMHG | HEART RATE: 109 BPM | BODY MASS INDEX: 26.43 KG/M2 | OXYGEN SATURATION: 98 % | WEIGHT: 140 LBS | HEIGHT: 61 IN

## 2025-07-25 DIAGNOSIS — E55.9 VITAMIN D DEFICIENCY, UNSPECIFIED: ICD-10-CM

## 2025-07-25 DIAGNOSIS — E78.5 HYPERLIPIDEMIA, UNSPECIFIED: ICD-10-CM

## 2025-07-25 LAB — GLUCOSE BLDC GLUCOMTR-MCNC: 251

## 2025-07-25 PROCEDURE — 36415 COLL VENOUS BLD VENIPUNCTURE: CPT

## 2025-07-25 PROCEDURE — 95251 CONT GLUC MNTR ANALYSIS I&R: CPT

## 2025-07-25 PROCEDURE — 99214 OFFICE O/P EST MOD 30 MIN: CPT

## 2025-07-25 PROCEDURE — 82962 GLUCOSE BLOOD TEST: CPT

## 2025-08-05 ENCOUNTER — APPOINTMENT (OUTPATIENT)
Dept: ENDOCRINOLOGY | Facility: CLINIC | Age: 55
End: 2025-08-05
Payer: COMMERCIAL

## 2025-08-05 PROCEDURE — G0108 DIAB MANAGE TRN  PER INDIV: CPT

## 2025-08-05 RX ORDER — INSULIN ASPART 100 [IU]/ML
100 INJECTION, SOLUTION INTRAVENOUS; SUBCUTANEOUS
Qty: 4 | Refills: 1 | Status: ACTIVE | COMMUNITY
Start: 2025-08-05 | End: 1900-01-01

## 2025-08-07 ENCOUNTER — APPOINTMENT (OUTPATIENT)
Dept: ENDOCRINOLOGY | Facility: CLINIC | Age: 55
End: 2025-08-07
Payer: COMMERCIAL

## 2025-08-07 DIAGNOSIS — E10.65 TYPE 1 DIABETES MELLITUS WITH HYPERGLYCEMIA: ICD-10-CM

## 2025-08-07 PROCEDURE — G0108 DIAB MANAGE TRN  PER INDIV: CPT

## 2025-09-02 ENCOUNTER — RX RENEWAL (OUTPATIENT)
Age: 55
End: 2025-09-02